# Patient Record
Sex: MALE | Race: WHITE | NOT HISPANIC OR LATINO | Employment: FULL TIME | URBAN - METROPOLITAN AREA
[De-identification: names, ages, dates, MRNs, and addresses within clinical notes are randomized per-mention and may not be internally consistent; named-entity substitution may affect disease eponyms.]

---

## 2021-03-12 ENCOUNTER — TRANSFERRED RECORDS (OUTPATIENT)
Dept: HEALTH INFORMATION MANAGEMENT | Facility: CLINIC | Age: 46
End: 2021-03-12

## 2021-04-19 ENCOUNTER — TRANSFERRED RECORDS (OUTPATIENT)
Dept: HEALTH INFORMATION MANAGEMENT | Facility: CLINIC | Age: 46
End: 2021-04-19

## 2021-06-01 ENCOUNTER — TRANSCRIBE ORDERS (OUTPATIENT)
Dept: OTHER | Age: 46
End: 2021-06-01

## 2021-06-01 DIAGNOSIS — N13.5 UPJ OBSTRUCTION, ACQUIRED: Primary | ICD-10-CM

## 2021-06-03 NOTE — TELEPHONE ENCOUNTER
MEDICAL RECORDS REQUEST   [facility]  Urology Clinic  [address] [place] [address]  PHONE: [phone]  Fax: [phone]        FUTURE VISIT INFORMATION                                                   Saleem Wilde, : 1975 scheduled for future visit at [facility]    APPOINTMENT INFORMATION:    Date: 21    Provider:  Jorge Luis    Reason for Visit/Diagnosis: UPJ obstruction    REFERRAL INFORMATION:    Referring provider:  Twin    Specialty: Urology    Referring providers clinic:  [facility] contact number:      RECORDS REQUESTED FOR VISIT                                                     NOTES  STATUS/DETAILS   OFFICE NOTE from referring provider  yes   OFFICE NOTE from other specialist  yes   DISCHARGE SUMMARY from hospital  no   DISCHARGE REPORT from the ER  no   OPERATIVE REPORT  no   MEDICATION LIST  yes   LABS     URINALYSIS (UA)  no   URINE CYTOLOGY  no

## 2021-08-05 ENCOUNTER — PRE VISIT (OUTPATIENT)
Dept: UROLOGY | Facility: CLINIC | Age: 46
End: 2021-08-05

## 2021-08-05 NOTE — TELEPHONE ENCOUNTER
Reason for visit: consult     Relevant information: UPJ obstruction    Records/imaging/labs/orders: in epic    Pt called: no     At Rooming: normal

## 2021-08-20 ENCOUNTER — LAB (OUTPATIENT)
Dept: LAB | Facility: CLINIC | Age: 46
End: 2021-08-20
Attending: UROLOGY
Payer: COMMERCIAL

## 2021-08-20 ENCOUNTER — PRE VISIT (OUTPATIENT)
Dept: UROLOGY | Facility: CLINIC | Age: 46
End: 2021-08-20

## 2021-08-20 ENCOUNTER — OFFICE VISIT (OUTPATIENT)
Dept: UROLOGY | Facility: CLINIC | Age: 46
End: 2021-08-20
Attending: UROLOGY
Payer: COMMERCIAL

## 2021-08-20 VITALS
HEART RATE: 88 BPM | WEIGHT: 305 LBS | HEIGHT: 67 IN | BODY MASS INDEX: 47.87 KG/M2 | SYSTOLIC BLOOD PRESSURE: 148 MMHG | DIASTOLIC BLOOD PRESSURE: 90 MMHG

## 2021-08-20 DIAGNOSIS — N13.5 UPJ OBSTRUCTION, ACQUIRED: ICD-10-CM

## 2021-08-20 LAB
ALBUMIN SERPL-MCNC: 3.5 G/DL (ref 3.4–5)
ALBUMIN UR-MCNC: 100 MG/DL
ALP SERPL-CCNC: 72 U/L (ref 40–150)
ALT SERPL W P-5'-P-CCNC: 18 U/L (ref 0–70)
ANION GAP SERPL CALCULATED.3IONS-SCNC: 7 MMOL/L (ref 3–14)
APPEARANCE UR: ABNORMAL
APTT PPP: 30 SECONDS (ref 22–38)
AST SERPL W P-5'-P-CCNC: 12 U/L (ref 0–45)
BILIRUB SERPL-MCNC: 0.6 MG/DL (ref 0.2–1.3)
BILIRUB UR QL STRIP: NEGATIVE
BUN SERPL-MCNC: 16 MG/DL (ref 7–30)
CALCIUM SERPL-MCNC: 9 MG/DL (ref 8.5–10.1)
CHLORIDE BLD-SCNC: 105 MMOL/L (ref 94–109)
CO2 SERPL-SCNC: 31 MMOL/L (ref 20–32)
COLOR UR AUTO: YELLOW
CREAT SERPL-MCNC: 0.6 MG/DL (ref 0.66–1.25)
GFR SERPL CREATININE-BSD FRML MDRD: >90 ML/MIN/1.73M2
GLUCOSE BLD-MCNC: 78 MG/DL (ref 70–99)
GLUCOSE UR STRIP-MCNC: NEGATIVE MG/DL
HGB UR QL STRIP: ABNORMAL
KETONES UR STRIP-MCNC: NEGATIVE MG/DL
LEUKOCYTE ESTERASE UR QL STRIP: ABNORMAL
MUCOUS THREADS #/AREA URNS LPF: PRESENT /LPF
NITRATE UR QL: NEGATIVE
PH UR STRIP: 6 [PH] (ref 5–7)
POTASSIUM BLD-SCNC: 3.6 MMOL/L (ref 3.4–5.3)
PROT SERPL-MCNC: 7.1 G/DL (ref 6.8–8.8)
RBC URINE: 21 /HPF
SODIUM SERPL-SCNC: 143 MMOL/L (ref 133–144)
SP GR UR STRIP: 1.02 (ref 1–1.03)
SQUAMOUS EPITHELIAL: 2 /HPF
UROBILINOGEN UR STRIP-MCNC: NORMAL MG/DL
WBC URINE: 42 /HPF
YEAST #/AREA URNS HPF: ABNORMAL /HPF

## 2021-08-20 PROCEDURE — 87106 FUNGI IDENTIFICATION YEAST: CPT | Performed by: UROLOGY

## 2021-08-20 PROCEDURE — 85730 THROMBOPLASTIN TIME PARTIAL: CPT | Performed by: PATHOLOGY

## 2021-08-20 PROCEDURE — 80053 COMPREHEN METABOLIC PANEL: CPT | Performed by: PATHOLOGY

## 2021-08-20 PROCEDURE — 87086 URINE CULTURE/COLONY COUNT: CPT | Performed by: UROLOGY

## 2021-08-20 PROCEDURE — 81001 URINALYSIS AUTO W/SCOPE: CPT | Performed by: PATHOLOGY

## 2021-08-20 PROCEDURE — 99205 OFFICE O/P NEW HI 60 MIN: CPT | Performed by: UROLOGY

## 2021-08-20 PROCEDURE — 36415 COLL VENOUS BLD VENIPUNCTURE: CPT | Performed by: PATHOLOGY

## 2021-08-20 RX ORDER — SEMAGLUTIDE 1.34 MG/ML
INJECTION, SOLUTION SUBCUTANEOUS
COMMUNITY
Start: 2021-07-27 | End: 2023-04-10

## 2021-08-20 RX ORDER — INSULIN GLARGINE 100 [IU]/ML
45 INJECTION, SOLUTION SUBCUTANEOUS AT BEDTIME
COMMUNITY
Start: 2021-07-26

## 2021-08-20 RX ORDER — IBUPROFEN 200 MG
400 TABLET ORAL EVERY 6 HOURS PRN
COMMUNITY

## 2021-08-20 ASSESSMENT — MIFFLIN-ST. JEOR: SCORE: 2217.1

## 2021-08-20 ASSESSMENT — PAIN SCALES - GENERAL: PAINLEVEL: NO PAIN (0)

## 2021-08-20 NOTE — NURSING NOTE
"Chief Complaint   Patient presents with     Consult       Blood pressure (!) 148/90, pulse 88, height 1.702 m (5' 7\"), weight 138.3 kg (305 lb). Body mass index is 47.77 kg/m .    There is no problem list on file for this patient.      No Known Allergies    Current Outpatient Medications   Medication Sig Dispense Refill     DULoxetine (CYMBALTA) 20 MG EC capsule Take 20 mg by mouth 2 times daily       ibuprofen (ADVIL/MOTRIN) 200 MG tablet Take 400 mg by mouth       insulin lispro prot & lispro (HUMALOG MIX 75/25 KWIKPEN) injection Inject Subcutaneous 3 times daily (before meals)       LANTUS SOLOSTAR 100 UNIT/ML soln ADMINISTER 37 UNITS UNDER THE SKIN EVERY MORNING       metFORMIN (GLUCOPHAGE) 500 MG/5ML SOLN solution        nystatin (MYCOSTATIN) cream Apply topically 2 times daily       order for DME Abdominal Binder - Large 2 each 3     OZEMPIC, 0.25 OR 0.5 MG/DOSE, 2 MG/1.5ML SOPN          Social History     Tobacco Use     Smoking status: Not on file   Substance Use Topics     Alcohol use: Not on file     Drug use: Not on file       Calvin Frazier  8/20/2021  7:26 AM  "

## 2021-08-20 NOTE — LETTER
8/20/2021       RE: Saleem Wilde  [address] [facility] [place] [address]     Dear Colleague,    Thank you for referring your patient, Saleem Wilde, to the [facility] UROLOGY [facility] [place] at [facility]. Please see a copy of my visit note below.    ASSESSMENT AND PLAN  Left  ureteropelvic junction obstruction.  Left percutaneous nephrostomy tube in place.    Complicated by:    Diabetes mellitus    Large midline ventral hernia.  Started as umbilical hernia, but he had complications post op and had to have wound vac.  He has persistent hernia with possible repair when his weight is down to #225.    Morbid obesity.  BMI 45 currently.      During counseling for this visit, we covered the details regarding the diagnosis of UPJ obstruction.  We covered possible etiologies such as congenital abnormalities, crossing vessels, and cancer.  We discussed treatment options including observation, stenting, endopyelotomy and pyeloplasty (open and robotic).    We covered how this is a relatively new treatment for the ureter but it has a long track record with urethral repair and initial outcomes appear promising.  I also talked about the advantages of a retrograde pyelogram at the time of any surgery for better diagnosis and well as the need for a ureteral stent and other drainage tubes (ROBERTO and vallejo catheter) if surgical treatment is undertaken.  Possible complications were discussed including but not limited to heart attack, stroke, blood clots to the lungs, death, muscle injury, nerve injury, spleen injury, pancreas injury, intestine injury, injury to the lungs, hernias, the possibility of persistent flank pain, need for additional procedures, and the possibility of recurrent obstruction.   Open surgery and even open nephrectomy may be necessary if robotic pyeloplasty is undertaken.    Plan    Obtain images for March CT    Schedule another Lasix Renogram to  "confirm left kidney function.  Video visit after.    If pyeloplasty is planned, a flank approach would be necessary.    Labs today  _________________________________________________________    CHIEF COMPLAINT  It was my pleasure to see Saleem Wilde who is a 45 year old male here for evaluation of possible ureteropelvic junction obstruction.    HPI  Mr. Wilde is here today with his family for evaluation of left UPJ obstruction.  His story began in November when he was diagnosed with COVID. As part of the workup for this, he underwent abdominal imaging and distention of the left renal pelvis was noted.  He denies any previous pain.  He was seen by Dr. Murcia at [facility] in [place].  It sounds like a stent was placed at some point, but he eventually ended up with a left nephrostomy tube, which he still has in place now.  Dr. Murcia went over treatment options, but Mr. Wilde has a number of issues, which make him a difficult surgical candidate, most prominently his morbid obesity and his large midline ventral hernia.  Because of this, Dr. Murcia referred him here to the [facility].    Since the nephrostomy tube was placed, Mr. Wilde denies any problems.      He denies any family history of this problem.          Per 5/25/21 Dr Murcia note  \"Saleem Wilde is a 45 year old male PMH of IDDM, obesity with large midline ventral hernia with complicated urologic history of left UPJ obstruction. In short, he initially presented to the urology department in November 2020 and was noted to have a dilated left renal pelvis full of debris and underwent stent placement at that time. He subsequently underwent ureteroscopy in January 2021 and again noted a large ball of white/gray debris. He was noted to have a narrowing of the left UPJ at that time as well (see fluoroscopic images below). Subsequently underwent a left percutaneous nephrostomy tube placement with twice daily irrigations and eventually was " "able to clear all of the debris. I performed a repeat ureteroscopy in March 2021 and noted the renal pelvis was clear and again noted the narrowing at the left UPJ. He returns today with a Lasix renogram to prove obstruction. Of note the renogram was performed with the left nephrostomy tube clamped.\"    4/19/21 Renogram  IMPRESSION:  1. Split renal function 73.7% Right, 26.3% Left.  2. Accumulation of activity within the left kidney which clears to some degree on post-Lasix study. The finding suggests fairly high-grade left-sided obstruction.  3. Mild patulous right renal collecting system which clears normally. No obstruction on the right.\"    Per 12/9/16 Dr Westbrook note  \"Multiple contraindications to surgical intervention.\"     Referred to PCP for weight management, diabetes control, continued smoking cessation.  Encourage to wear binder when out of bed.  Patient must have BMI <35, Today 60\"      Kidney stone history: Denies  Urinary tract infection history: Denies    7/23/21 Creatinine 0.77    Drinks 6 beers 2x per month.  10 years ago would drink 12 beers per day.      3/12/21 CT Abdomen/Pelvis without contrast   Radiologist Impression  1. A percutaneous left nephrostomy tube, and a double-J left ureteral catheter remain in place. Mild-to-moderate perinephric stranding similar. No hydronephrosis.   2. A complex large ventral hernia with herniation of colon and small bowel loops, without evidence of bowel obstruction.         PHYSICAL EXAM  Vitals:    08/20/21 0723   BP: (!) 148/90   Pulse: 88   Weight: 138.3 kg (305 lb)   Height: 1.702 m (5' 7\")     Wt Readings from Last 3 Encounters:   08/20/21 138.3 kg (305 lb)   12/09/16 (!) 173 kg (381 lb 6.4 oz)     Abdominal exam: Large midline hernia and/or pannus        CC  Patient Care Team:  Linh Galindo MD as PCP - General (Internal Medicine)  Dusty Westbrook MD as MD (Surgery)  Javy Kang MD as MD (Urology)  FABIEN CASTRO    Copy to " patient  DOMINIC LETICIA KATJA  [address] [facility] [place] [address]

## 2021-08-20 NOTE — PROGRESS NOTES
ASSESSMENT AND PLAN  Left  ureteropelvic junction obstruction.  Left percutaneous nephrostomy tube in place.    Complicated by:    Diabetes mellitus    Large midline ventral hernia.  Started as umbilical hernia, but he had complications post op and had to have wound vac.  He has persistent hernia with possible repair when his weight is down to #225.    Morbid obesity.  BMI 45 currently.      During counseling for this visit, we covered the details regarding the diagnosis of UPJ obstruction.  We covered possible etiologies such as congenital abnormalities, crossing vessels, and cancer.  We discussed treatment options including observation, stenting, endopyelotomy and pyeloplasty (open and robotic).    We covered how this is a relatively new treatment for the ureter but it has a long track record with urethral repair and initial outcomes appear promising.  I also talked about the advantages of a retrograde pyelogram at the time of any surgery for better diagnosis and well as the need for a ureteral stent and other drainage tubes (ROBERTO and vallejo catheter) if surgical treatment is undertaken.  Possible complications were discussed including but not limited to heart attack, stroke, blood clots to the lungs, death, muscle injury, nerve injury, spleen injury, pancreas injury, intestine injury, injury to the lungs, hernias, the possibility of persistent flank pain, need for additional procedures, and the possibility of recurrent obstruction.   Open surgery and even open nephrectomy may be necessary if robotic pyeloplasty is undertaken.    Plan    Obtain images for March CT    Schedule another Lasix Renogram to confirm left kidney function.  Video visit after.    If pyeloplasty is planned, a flank approach would be necessary.    Labs today  _________________________________________________________    CHIEF COMPLAINT  It was my pleasure to see Saleem Wilde who is a 45 year old male here for evaluation of possible  "ureteropelvic junction obstruction.    HPI  Mr. Wilde is here today with his family for evaluation of left UPJ obstruction.  His story began in November when he was diagnosed with COVID. As part of the workup for this, he underwent abdominal imaging and distention of the left renal pelvis was noted.  He denies any previous pain.  He was seen by Dr. Murcia at [facility] in [place].  It sounds like a stent was placed at some point, but he eventually ended up with a left nephrostomy tube, which he still has in place now.  Dr. Murcia went over treatment options, but Mr. Wilde has a number of issues, which make him a difficult surgical candidate, most prominently his morbid obesity and his large midline ventral hernia.  Because of this, Dr. Murcia referred him here to the [facility].    Since the nephrostomy tube was placed, Mr. Wilde denies any problems.      He denies any family history of this problem.          Per 5/25/21 Dr Murcia note  \"Saleem Wilde is a 45 year old male PMH of IDDM, obesity with large midline ventral hernia with complicated urologic history of left UPJ obstruction. In short, he initially presented to the urology department in November 2020 and was noted to have a dilated left renal pelvis full of debris and underwent stent placement at that time. He subsequently underwent ureteroscopy in January 2021 and again noted a large ball of white/gray debris. He was noted to have a narrowing of the left UPJ at that time as well (see fluoroscopic images below). Subsequently underwent a left percutaneous nephrostomy tube placement with twice daily irrigations and eventually was able to clear all of the debris. I performed a repeat ureteroscopy in March 2021 and noted the renal pelvis was clear and again noted the narrowing at the left UPJ. He returns today with a Lasix renogram to prove obstruction. Of note the renogram was performed with the left nephrostomy tube clamped.\"    4/19/21 " "Renogram  IMPRESSION:  1. Split renal function 73.7% Right, 26.3% Left.  2. Accumulation of activity within the left kidney which clears to some degree on post-Lasix study. The finding suggests fairly high-grade left-sided obstruction.  3. Mild patulous right renal collecting system which clears normally. No obstruction on the right.\"    Per 12/9/16 Dr Westbrook note  \"Multiple contraindications to surgical intervention.\"     Referred to PCP for weight management, diabetes control, continued smoking cessation.  Encourage to wear binder when out of bed.  Patient must have BMI <35, Today 60\"      Kidney stone history: Denies  Urinary tract infection history: Denies    7/23/21 Creatinine 0.77    Drinks 6 beers 2x per month.  10 years ago would drink 12 beers per day.      3/12/21 CT Abdomen/Pelvis without contrast   Radiologist Impression  1. A percutaneous left nephrostomy tube, and a double-J left ureteral catheter remain in place. Mild-to-moderate perinephric stranding similar. No hydronephrosis.   2. A complex large ventral hernia with herniation of colon and small bowel loops, without evidence of bowel obstruction.         PHYSICAL EXAM  Vitals:    08/20/21 0723   BP: (!) 148/90   Pulse: 88   Weight: 138.3 kg (305 lb)   Height: 1.702 m (5' 7\")     Wt Readings from Last 3 Encounters:   08/20/21 138.3 kg (305 lb)   12/09/16 (!) 173 kg (381 lb 6.4 oz)     Abdominal exam: Large midline hernia and/or pannus        CC  Patient Care Team:  Linh Galindo MD as PCP - General (Internal Medicine)  Dusty Westbrook MD as MD (Surgery)  Javy Kang MD as MD (Urology)  FABIEN CASTRO    Copy to patient  DOMINIC KELLY KATJA  [address] [facility] [place] [address]    "

## 2021-08-21 LAB — BACTERIA UR CULT: ABNORMAL

## 2021-09-16 ENCOUNTER — PRE VISIT (OUTPATIENT)
Dept: UROLOGY | Facility: CLINIC | Age: 46
End: 2021-09-16

## 2021-09-16 NOTE — TELEPHONE ENCOUNTER
Reason for visit: follow up     Relevant information: UPJ obstruction    Records/imaging/labs/orders: in epic    Pt called: no    At Rooming: virtual

## 2021-09-24 ENCOUNTER — VIRTUAL VISIT (OUTPATIENT)
Dept: UROLOGY | Facility: CLINIC | Age: 46
End: 2021-09-24
Payer: COMMERCIAL

## 2021-09-24 DIAGNOSIS — Q62.39 CONGENITAL OBSTRUCTION OF URETEROPELVIC JUNCTION: ICD-10-CM

## 2021-09-24 PROCEDURE — 99213 OFFICE O/P EST LOW 20 MIN: CPT | Mod: 95 | Performed by: UROLOGY

## 2021-09-24 NOTE — PROGRESS NOTES
Saleem is a 45 year old who is being evaluated via a billable telephone visit.      What phone number would you like to be contacted at? [phone]  How would you like to obtain your AVS? [email or web address]  Phone call duration: *** minutes

## 2021-09-24 NOTE — LETTER
9/24/2021       RE: Saleem Wilde  [address]  [address]  [facility] [address]     Dear Colleague,    Thank you for referring your patient, Saleem Wilde, to the [facility]OLOGY [facility] [place] at [facility]. Please see a copy of my visit note below.    ASSESSMENT AND PLAN  Left  ureteropelvic junction obstruction.  Left percutaneous nephrostomy tube in place.    Complicated by:    Diabetes mellitus    Large midline ventral hernia.  Started as umbilical hernia, but he had complications post op and had to have wound vac.  He has persistent hernia with possible repair when his weight is down to #225.    Morbid obesity.  BMI 45 currently.        Plan    Obtain images for March CT    Schedule another Lasix Renogram to confirm left kidney function. This wasn't done before the visit today, but I did talk with Dr Red in [place] about this.  Plan video visit after.    If pyeloplasty is planned, a flank approach would be necessary.    Schedule video visit in 6-8 weeks.  _________________________________________________________    CHIEF COMPLAINT  It was my pleasure to see Saleem Wilde who is a 45 year old male here for evaluation of possible ureteropelvic junction obstruction.    HPI  Mr. Wilde is here today with his family for evaluation of left UPJ obstruction.  His story began in November when he was diagnosed with COVID. As part of the workup for this, he underwent abdominal imaging and distention of the left renal pelvis was noted.  He denies any previous pain.  He was seen by Dr. Murcia at [facility] in [place].  It sounds like a stent was placed at some point, but he eventually ended up with a left nephrostomy tube, which he still has in place now.  Dr. Murcia went over treatment options, but Mr. Wilde has a number of issues, which make him a difficult surgical candidate, most prominently his morbid obesity and his large midline ventral hernia.   "Because of this, Dr. Murcia referred him here to the [facility].    Since the nephrostomy tube was placed, Mr. Wilde denies any problems.      He denies any family history of this problem.          Per 5/25/21 Dr Murcia note  \"Saleem Wilde is a 45 year old male PMH of IDDM, obesity with large midline ventral hernia with complicated urologic history of left UPJ obstruction. In short, he initially presented to the urology department in November 2020 and was noted to have a dilated left renal pelvis full of debris and underwent stent placement at that time. He subsequently underwent ureteroscopy in January 2021 and again noted a large ball of white/gray debris. He was noted to have a narrowing of the left UPJ at that time as well (see fluoroscopic images below). Subsequently underwent a left percutaneous nephrostomy tube placement with twice daily irrigations and eventually was able to clear all of the debris. I performed a repeat ureteroscopy in March 2021 and noted the renal pelvis was clear and again noted the narrowing at the left UPJ. He returns today with a Lasix renogram to prove obstruction. Of note the renogram was performed with the left nephrostomy tube clamped.\"    4/19/21 Renogram  IMPRESSION:  1. Split renal function 73.7% Right, 26.3% Left.  2. Accumulation of activity within the left kidney which clears to some degree on post-Lasix study. The finding suggests fairly high-grade left-sided obstruction.  3. Mild patulous right renal collecting system which clears normally. No obstruction on the right.\"    Per 12/9/16 Dr Westbrook note  \"Multiple contraindications to surgical intervention.\"     Referred to PCP for weight management, diabetes control, continued smoking cessation.  Encourage to wear binder when out of bed.  Patient must have BMI <35, Today 60\"      Kidney stone history: Denies  Urinary tract infection history: Denies    7/23/21 Creatinine 0.77    Drinks 6 beers 2x per month.  " 10 years ago would drink 12 beers per day.    9/24/21:  No new symptoms.  Percutaneous nephrostomy tube is very bothersome for him.    3/12/21 CT Abdomen/Pelvis without contrast   Radiologist Impression  1. A percutaneous left nephrostomy tube, and a double-J left ureteral catheter remain in place. Mild-to-moderate perinephric stranding similar. No hydronephrosis.   2. A complex large ventral hernia with herniation of colon and small bowel loops, without evidence of bowel obstruction.         PHYSICAL EXAM  There were no vitals filed for this visit.  Wt Readings from Last 3 Encounters:   08/20/21 138.3 kg (305 lb)   12/09/16 (!) 173 kg (381 lb 6.4 oz)       This visit was done via phone at the patient's request.  Time spent on phone call: 5 min  Time spent on pre-visit work, post visit work, and documentation outside of phone call time on day of visit: 2 min  Total time: 7 min          CC  Patient Care Team:  Linh Galindo MD as PCP - General (Internal Medicine)  Dusty Westbrook MD as MD (Surgery)  Javy Kang MD as MD (Urology)  FABIEN CASTRO    Copy to patient  DOMINIC HIGHTOWER  [address] [facility] [address]

## 2021-09-24 NOTE — PATIENT INSTRUCTIONS
Please Follow up with Dr. Kang in 6-8 weeks virtually with a lasix renogram beforehand.     It was a pleasure meeting with you today.  Thank you for allowing me and my team the privilege of caring for you today.  YOU are the reason we are here, and I truly hope we provided you with the excellent service you deserve.  Please let us know if there is anything else we can do for you so that we can be sure you are leaving completely satisfied with your care experience.

## 2021-09-24 NOTE — PROGRESS NOTES
"ASSESSMENT AND PLAN  Left  ureteropelvic junction obstruction.  Left percutaneous nephrostomy tube in place.    Complicated by:    Diabetes mellitus    Large midline ventral hernia.  Started as umbilical hernia, but he had complications post op and had to have wound vac.  He has persistent hernia with possible repair when his weight is down to #225.    Morbid obesity.  BMI 45 currently.        Plan    Obtain images for March CT    Schedule another Lasix Renogram to confirm left kidney function. This wasn't done before the visit today, but I did talk with Dr Red in [place] about this.  Plan video visit after.    If pyeloplasty is planned, a flank approach would be necessary.    Schedule video visit in 6-8 weeks.  _________________________________________________________    CHIEF COMPLAINT  It was my pleasure to see Saleem Wilde who is a 45 year old male here for evaluation of possible ureteropelvic junction obstruction.    HPI  Mr. Wilde is here today with his family for evaluation of left UPJ obstruction.  His story began in November when he was diagnosed with COVID. As part of the workup for this, he underwent abdominal imaging and distention of the left renal pelvis was noted.  He denies any previous pain.  He was seen by Dr. Murcia at [facility] in [place].  It sounds like a stent was placed at some point, but he eventually ended up with a left nephrostomy tube, which he still has in place now.  Dr. Murcia went over treatment options, but Mr. Wilde has a number of issues, which make him a difficult surgical candidate, most prominently his morbid obesity and his large midline ventral hernia.  Because of this, Dr. Murcia referred him here to the [facility].    Since the nephrostomy tube was placed, Mr. Wilde denies any problems.      He denies any family history of this problem.          Per 5/25/21 Dr Murcia note  \"Saleem Wilde is a 45 year old male PMH of IDDM, obesity with large " "midline ventral hernia with complicated urologic history of left UPJ obstruction. In short, he initially presented to the urology department in November 2020 and was noted to have a dilated left renal pelvis full of debris and underwent stent placement at that time. He subsequently underwent ureteroscopy in January 2021 and again noted a large ball of white/gray debris. He was noted to have a narrowing of the left UPJ at that time as well (see fluoroscopic images below). Subsequently underwent a left percutaneous nephrostomy tube placement with twice daily irrigations and eventually was able to clear all of the debris. I performed a repeat ureteroscopy in March 2021 and noted the renal pelvis was clear and again noted the narrowing at the left UPJ. He returns today with a Lasix renogram to prove obstruction. Of note the renogram was performed with the left nephrostomy tube clamped.\"    4/19/21 Renogram  IMPRESSION:  1. Split renal function 73.7% Right, 26.3% Left.  2. Accumulation of activity within the left kidney which clears to some degree on post-Lasix study. The finding suggests fairly high-grade left-sided obstruction.  3. Mild patulous right renal collecting system which clears normally. No obstruction on the right.\"    Per 12/9/16 Dr Westbrook note  \"Multiple contraindications to surgical intervention.\"     Referred to PCP for weight management, diabetes control, continued smoking cessation.  Encourage to wear binder when out of bed.  Patient must have BMI <35, Today 60\"      Kidney stone history: Denies  Urinary tract infection history: Denies    7/23/21 Creatinine 0.77    Drinks 6 beers 2x per month.  10 years ago would drink 12 beers per day.    9/24/21:  No new symptoms.  Percutaneous nephrostomy tube is very bothersome for him.    3/12/21 CT Abdomen/Pelvis without contrast   Radiologist Impression  1. A percutaneous left nephrostomy tube, and a double-J left ureteral catheter remain in place. " Mild-to-moderate perinephric stranding similar. No hydronephrosis.   2. A complex large ventral hernia with herniation of colon and small bowel loops, without evidence of bowel obstruction.         PHYSICAL EXAM  There were no vitals filed for this visit.  Wt Readings from Last 3 Encounters:   08/20/21 138.3 kg (305 lb)   12/09/16 (!) 173 kg (381 lb 6.4 oz)       This visit was done via phone at the patient's request.  Time spent on phone call: 5 min  Time spent on pre-visit work, post visit work, and documentation outside of phone call time on day of visit: 2 min  Total time: 7 min          CC  Patient Care Team:  Linh Galindo MD as PCP - General (Internal Medicine)  Dusty Westbrook MD as MD (Surgery)  Javy Kang MD as MD (Urology)  FABIEN CASTRO    Copy to patient  DOMINIC HIGHTOWER  [address] [facility] [place] [address]

## 2021-11-11 ENCOUNTER — PRE VISIT (OUTPATIENT)
Dept: UROLOGY | Facility: CLINIC | Age: 46
End: 2021-11-11
Payer: COMMERCIAL

## 2021-11-11 NOTE — TELEPHONE ENCOUNTER
Reason for visit: follow up     Relevant information: UPJ obstruction    Records/imaging/labs/orders: lasix renogram in epic    Pt called: no    At Rooming: phone

## 2021-11-12 ENCOUNTER — PREP FOR PROCEDURE (OUTPATIENT)
Dept: UROLOGY | Facility: CLINIC | Age: 46
End: 2021-11-12

## 2021-11-12 ENCOUNTER — VIRTUAL VISIT (OUTPATIENT)
Dept: UROLOGY | Facility: CLINIC | Age: 46
End: 2021-11-12
Payer: COMMERCIAL

## 2021-11-12 DIAGNOSIS — Q62.39 CONGENITAL OBSTRUCTION OF URETEROPELVIC JUNCTION: Primary | ICD-10-CM

## 2021-11-12 DIAGNOSIS — N13.5 STRICTURE OR KINKING OF URETER: Primary | ICD-10-CM

## 2021-11-12 PROCEDURE — 99213 OFFICE O/P EST LOW 20 MIN: CPT | Mod: 95 | Performed by: UROLOGY

## 2021-11-12 RX ORDER — CEFAZOLIN SODIUM IN 0.9 % NACL 3 G/100 ML
3 INTRAVENOUS SOLUTION, PIGGYBACK (ML) INTRAVENOUS SEE ADMIN INSTRUCTIONS
Status: CANCELLED | OUTPATIENT
Start: 2021-11-12

## 2021-11-12 RX ORDER — CEFAZOLIN SODIUM IN 0.9 % NACL 3 G/100 ML
3 INTRAVENOUS SOLUTION, PIGGYBACK (ML) INTRAVENOUS
Status: CANCELLED | OUTPATIENT
Start: 2021-11-12

## 2021-11-12 NOTE — PROGRESS NOTES
Saleem is a 46 year old who is being evaluated via a billable telephone visit.      What phone number would you like to be contacted at? [phone]  How would you like to obtain your AVS? Mail a copy  Phone call duration: *** minutes

## 2021-11-12 NOTE — LETTER
11/12/2021       RE: Saleem Wilde  [address] [facility] [address]  [place] [address]     Dear Colleague,    Thank you for referring your patient, Saleem Wilde, to the [facility] [place] [facility]. Please see a copy of my visit note below.    ASSESSMENT AND PLAN  Left  ureteropelvic junction obstruction.  Left percutaneous nephrostomy tube in place.    Complicated by:    Diabetes mellitus    Large midline ventral hernia.  Started as umbilical hernia, but he had complications post op and had to have wound vac.  He has persistent hernia with possible repair when his weight is down to #225.    Morbid obesity.  BMI 45 currently.        Plan    Schedule left retrograde pyelogram, ureteroscopy, possible robot pyeloplasty.  _________________________________________________________    CHIEF COMPLAINT  It was my pleasure to see Saleem Wilde who is a 45 year old male here for evaluation of possible ureteropelvic junction obstruction.    HPI  Mr. Wilde is here today with his family for evaluation of left UPJ obstruction.  His story began in November when he was diagnosed with COVID. As part of the workup for this, he underwent abdominal imaging and distention of the left renal pelvis was noted.  He denies any previous pain.  He was seen by Dr. Murcia at [facility] in [place].  It sounds like a stent was placed at some point, but he eventually ended up with a left nephrostomy tube, which he still has in place now.  Dr. Murcia went over treatment options, but Mr. Wilde has a number of issues, which make him a difficult surgical candidate, most prominently his morbid obesity and his large midline ventral hernia.  Because of this, Dr. Murcia referred him here to the [facility].    Since the nephrostomy tube was placed, Mr. Wilde denies any problems.      He denies any family history of this problem.    10/5/21 Lasix Renogram   I  "reviewed the radiologic images and report from this radiologic exam.  My independent interpretation is:    No images    Radiologist Impression  FINDINGS: There is asymmetrically diminished perfusion to the left kidney. The split function is 30% on the left and 70% on the right.     Following Lasix administration, the T half max on the left is 12 minutes. T half max on the right is 17 minutes following Lasix administration.     IMPRESSION:   1.  Asymmetric decreased uptake and perfusion of radiotracer in the left kidney.   2.  Normal left renal excretion following Lasix administration.   3.  T half max on the right is within the indeterminate range.           Per 5/25/21 Dr Murcia note  \"Saleem Wilde is a 45 year old male PMH of IDDM, obesity with large midline ventral hernia with complicated urologic history of left UPJ obstruction. In short, he initially presented to the urology department in November 2020 and was noted to have a dilated left renal pelvis full of debris and underwent stent placement at that time. He subsequently underwent ureteroscopy in January 2021 and again noted a large ball of white/gray debris. He was noted to have a narrowing of the left UPJ at that time as well (see fluoroscopic images below). Subsequently underwent a left percutaneous nephrostomy tube placement with twice daily irrigations and eventually was able to clear all of the debris. I performed a repeat ureteroscopy in March 2021 and noted the renal pelvis was clear and again noted the narrowing at the left UPJ. He returns today with a Lasix renogram to prove obstruction. Of note the renogram was performed with the left nephrostomy tube clamped.\"    4/19/21 Renogram  IMPRESSION:  1. Split renal function 73.7% Right, 26.3% Left.  2. Accumulation of activity within the left kidney which clears to some degree on post-Lasix study. The finding suggests fairly high-grade left-sided obstruction.  3. Mild patulous right renal " "collecting system which clears normally. No obstruction on the right.\"    Per 12/9/16 Dr Westbrook note  \"Multiple contraindications to surgical intervention.\"     Referred to PCP for weight management, diabetes control, continued smoking cessation.  Encourage to wear binder when out of bed.  Patient must have BMI <35, Today 60\"      Kidney stone history: Denies  Urinary tract infection history: Denies    7/23/21 Creatinine 0.77    Drinks 6 beers 2x per month.  10 years ago would drink 12 beers per day.    9/24/21:  No new symptoms.  Percutaneous nephrostomy tube is very bothersome for him.    3/12/21 CT Abdomen/Pelvis without contrast   Radiologist Impression  1. A percutaneous left nephrostomy tube, and a double-J left ureteral catheter remain in place. Mild-to-moderate perinephric stranding similar. No hydronephrosis.   2. A complex large ventral hernia with herniation of colon and small bowel loops, without evidence of bowel obstruction.         PHYSICAL EXAM  There were no vitals filed for this visit.  Wt Readings from Last 3 Encounters:   08/20/21 138.3 kg (305 lb)   12/09/16 (!) 173 kg (381 lb 6.4 oz)       This visit was done via phone at the patient's request.  Time spent on phone call: 5 min  Time spent on pre-visit work, post visit work, and documentation outside of phone call time on day of visit: 2 min  Total time: 7 min          CC  Patient Care Team:  Linh Galindo MD as PCP - General (Internal Medicine)  Dusty Westbrook MD as MD (Surgery)  Javy Kang MD as MD (Urology)  Javy Kang MD as Assigned Surgical Provider  FABIEN CASTRO    Copy to patient  DOMINIC HIGHTOWER  [address] [facility] [place] [address]      "

## 2021-11-12 NOTE — PROGRESS NOTES
ASSESSMENT AND PLAN  Left  ureteropelvic junction obstruction.  Left percutaneous nephrostomy tube in place.    Complicated by:    Diabetes mellitus    Large midline ventral hernia.  Started as umbilical hernia, but he had complications post op and had to have wound vac.  He has persistent hernia with possible repair when his weight is down to #225.    Morbid obesity.  BMI 45 currently.        Plan    Schedule left retrograde pyelogram, ureteroscopy, possible robot pyeloplasty.  _________________________________________________________    CHIEF COMPLAINT  It was my pleasure to see Saleem Wilde who is a 45 year old male here for evaluation of possible ureteropelvic junction obstruction.    HPI  Mr. Wilde is here today with his family for evaluation of left UPJ obstruction.  His story began in November when he was diagnosed with COVID. As part of the workup for this, he underwent abdominal imaging and distention of the left renal pelvis was noted.  He denies any previous pain.  He was seen by Dr. Murcia at [facility] in [place].  It sounds like a stent was placed at some point, but he eventually ended up with a left nephrostomy tube, which he still has in place now.  Dr. Murcia went over treatment options, but Mr. Wilde has a number of issues, which make him a difficult surgical candidate, most prominently his morbid obesity and his large midline ventral hernia.  Because of this, Dr. Murcia referred him here to the [facility].    Since the nephrostomy tube was placed, Mr. Wilde denies any problems.      He denies any family history of this problem.    10/5/21 Lasix Renogram   I reviewed the radiologic images and report from this radiologic exam.  My independent interpretation is:    No images    Radiologist Impression  FINDINGS: There is asymmetrically diminished perfusion to the left kidney. The split function is 30% on the left and 70% on the right.     Following Lasix administration, the T  "half max on the left is 12 minutes. T half max on the right is 17 minutes following Lasix administration.     IMPRESSION:   1.  Asymmetric decreased uptake and perfusion of radiotracer in the left kidney.   2.  Normal left renal excretion following Lasix administration.   3.  T half max on the right is within the indeterminate range.           Per 5/25/21 Dr Murcia note  \"Saleem Wilde is a 45 year old male PMH of IDDM, obesity with large midline ventral hernia with complicated urologic history of left UPJ obstruction. In short, he initially presented to the urology department in November 2020 and was noted to have a dilated left renal pelvis full of debris and underwent stent placement at that time. He subsequently underwent ureteroscopy in January 2021 and again noted a large ball of white/gray debris. He was noted to have a narrowing of the left UPJ at that time as well (see fluoroscopic images below). Subsequently underwent a left percutaneous nephrostomy tube placement with twice daily irrigations and eventually was able to clear all of the debris. I performed a repeat ureteroscopy in March 2021 and noted the renal pelvis was clear and again noted the narrowing at the left UPJ. He returns today with a Lasix renogram to prove obstruction. Of note the renogram was performed with the left nephrostomy tube clamped.\"    4/19/21 Renogram  IMPRESSION:  1. Split renal function 73.7% Right, 26.3% Left.  2. Accumulation of activity within the left kidney which clears to some degree on post-Lasix study. The finding suggests fairly high-grade left-sided obstruction.  3. Mild patulous right renal collecting system which clears normally. No obstruction on the right.\"    Per 12/9/16 Dr Westbrook note  \"Multiple contraindications to surgical intervention.\"     Referred to PCP for weight management, diabetes control, continued smoking cessation.  Encourage to wear binder when out of bed.  Patient must have BMI <35, Today " "60\"      Kidney stone history: Denies  Urinary tract infection history: Denies    7/23/21 Creatinine 0.77    Drinks 6 beers 2x per month.  10 years ago would drink 12 beers per day.    9/24/21:  No new symptoms.  Percutaneous nephrostomy tube is very bothersome for him.    3/12/21 CT Abdomen/Pelvis without contrast   Radiologist Impression  1. A percutaneous left nephrostomy tube, and a double-J left ureteral catheter remain in place. Mild-to-moderate perinephric stranding similar. No hydronephrosis.   2. A complex large ventral hernia with herniation of colon and small bowel loops, without evidence of bowel obstruction.         PHYSICAL EXAM  There were no vitals filed for this visit.  Wt Readings from Last 3 Encounters:   08/20/21 138.3 kg (305 lb)   12/09/16 (!) 173 kg (381 lb 6.4 oz)       This visit was done via phone at the patient's request.  Time spent on phone call: 5 min  Time spent on pre-visit work, post visit work, and documentation outside of phone call time on day of visit: 2 min  Total time: 7 min          CC  Patient Care Team:  Linh Galindo MD as PCP - General (Internal Medicine)  Dusty Westbrook MD as MD (Surgery)  Javy Kang MD as MD (Urology)  Javy Kang MD as Assigned Surgical Provider  FABIEN CASTRO    Copy to patient  DOMINIC HIGHTOWER  [address] [facility] [place] [address]    "

## 2021-11-22 NOTE — PATIENT INSTRUCTIONS
Please proceed with surgery as desired.     It was a pleasure meeting with you today.  Thank you for allowing me and my team the privilege of caring for you today.  YOU are the reason we are here, and I truly hope we provided you with the excellent service you deserve.  Please let us know if there is anything else we can do for you so that we can be sure you are leaving completely satisfied with your care experience.

## 2021-11-30 ENCOUNTER — TELEPHONE (OUTPATIENT)
Dept: UROLOGY | Facility: CLINIC | Age: 46
End: 2021-11-30
Payer: COMMERCIAL

## 2021-11-30 NOTE — CONFIDENTIAL NOTE
Patient is scheduled for surgery with Dr. Kang    Spoke with: Saleem Coleman voice mail with: n/a    Date of Surgery: 2/14/22    Location: [facility]    H&P: Scheduled with [facility]    Pre-procedure COVID-19 Test: patient to schedule closer to home within 4 days of the surgery    Additional imaging/appointments: n/a    Surgery packet: to be mailed by 12/1/21     Additional comments: n/a

## 2022-01-19 ENCOUNTER — TELEPHONE (OUTPATIENT)
Dept: UROLOGY | Facility: CLINIC | Age: 47
End: 2022-01-19
Payer: COMMERCIAL

## 2022-01-19 DIAGNOSIS — Z11.59 SPECIAL SCREENING EXAMINATION FOR VIRAL DISEASE: ICD-10-CM

## 2022-01-19 DIAGNOSIS — Q62.39 CONGENITAL OBSTRUCTION OF URETEROPELVIC JUNCTION: Primary | ICD-10-CM

## 2022-01-19 NOTE — TELEPHONE ENCOUNTER
----- Message from Yaquelin Jimenez RN sent at 1/19/2022  2:38 PM CST -----  Can you please fax the order for the UA and COVID to [phone] for this patient. The orders are on the printer. Thank you

## 2022-02-02 DIAGNOSIS — Z11.59 ENCOUNTER FOR SCREENING FOR OTHER VIRAL DISEASES: Primary | ICD-10-CM

## 2022-02-10 ENCOUNTER — ANESTHESIA EVENT (OUTPATIENT)
Dept: SURGERY | Facility: CLINIC | Age: 47
End: 2022-02-10

## 2022-02-11 ENCOUNTER — PATIENT OUTREACH (OUTPATIENT)
Dept: UROLOGY | Facility: CLINIC | Age: 47
End: 2022-02-11
Payer: COMMERCIAL

## 2022-02-11 DIAGNOSIS — N13.5 UPJ OBSTRUCTION, ACQUIRED: Primary | ICD-10-CM

## 2022-02-11 RX ORDER — CIPROFLOXACIN 500 MG/1
500 TABLET, FILM COATED ORAL 2 TIMES DAILY
Qty: 6 TABLET | Refills: 0 | Status: SHIPPED | OUTPATIENT
Start: 2022-02-11 | End: 2022-02-14

## 2022-02-11 NOTE — TELEPHONE ENCOUNTER
Pre Op Teaching Flowsheet       Pre and Post op Patient Education  Relevant Diagnosis:  Stricture or kinking of ureter  Surgical procedure:  PYELOPLASTY, ROBOT-ASSISTED, LAPAROSCOPIC. Cystoscopy, ureteroscopy, stent  Person Involved in teaching: Saleem Wilde      Patient demonstrates understanding of the following:  Date of surgery:  2/14/22  Location of surgery:  3rd [facility]  History and Physical and any other testing necessary prior to surgery: Yes  Required time line for completion of History and Physical and any pre-op testing: Yes      Patient demonstrates understanding of the following:  Pre-op bowel prep:  None  Pre-op showering/scrub information with PCMX Soap: Yes  Blood thinner medications discussed and when to stop (if applicable):  Yes  Diabetic Management teaching:  Yes      Infection Prevention:   Patient demonstrates understanding of the following:  Surgical procedure site care taught: at time of discharge  Signs and symptoms of infection taught:  Yes      Post-op follow-up:  Discussed how to contact the hospital, nurse, and clinic scheduling staff if necessary.    Instructional materials used/given/mailed:  [place] Surgery Booklet, Soap..    Surgical instructions packet mailed to patient.     referral: No     home:  Yes    Care Giver:  Sister    PCP:  Linh Galindo MD    Patient had COVID testing done.

## 2022-02-13 ASSESSMENT — LIFESTYLE VARIABLES: TOBACCO_USE: 1

## 2022-02-13 NOTE — ANESTHESIA PREPROCEDURE EVALUATION
"Anesthesia Pre-Procedure Evaluation    Patient: Saleem Wilde   MRN:     [id number] Gender:   male   Age:    46 year old :      1975        Preoperative Diagnosis: Stricture or kinking of ureter [N13.5]   Procedure(s):  PYELOPLASTY, ROBOT-ASSISTED, LAPAROSCOPIC. Cystoscopy, ureteroscopy, stent LEFT     LABS:  CBC: No results found for: WBC, HGB, HCT, PLT  BMP:   Lab Results   Component Value Date     2021    POTASSIUM 3.6 2021    CHLORIDE 105 2021    CO2 31 2021    BUN 16 2021    CR 0.60 (L) 2021    GLC 78 2021     COAGS:   Lab Results   Component Value Date    PTT 30 2021     POC: No results found for: BGM, HCG, HCGS  OTHER:   Lab Results   Component Value Date    GUILLE 9.0 2021    ALBUMIN 3.5 2021    PROTTOTAL 7.1 2021    ALT 18 2021    AST 12 2021    ALKPHOS 72 2021    BILITOTAL 0.6 2021        Preop Vitals    BP Readings from Last 3 Encounters:   21 (!) 148/90   16 145/86    Pulse Readings from Last 3 Encounters:   21 88   16 93      Resp Readings from Last 3 Encounters:   No data found for Resp    SpO2 Readings from Last 3 Encounters:   16 94%      Temp Readings from Last 1 Encounters:   No data found for Temp    Ht Readings from Last 1 Encounters:   21 1.702 m (5' 7\")      Wt Readings from Last 1 Encounters:   21 138.3 kg (305 lb)    Estimated body mass index is 47.77 kg/m  as calculated from the following:    Height as of 21: 1.702 m (5' 7\").    Weight as of 21: 138.3 kg (305 lb).     LDA:        History reviewed. No pertinent past medical history.   History reviewed. No pertinent surgical history.   Allergies   Allergen Reactions     Adhesive Tape Other (See Comments)     Causes burns and blisters        Anesthesia Evaluation    ANESTHESIA PHYSICAL [id number]         Yenifer Higginbotham MD  "

## 2022-02-13 NOTE — ANESTHESIA PREPROCEDURE EVALUATION
Anesthesia Pre-Procedure Evaluation    Patient: Saleem Wilde   MRN: [id number] : 1975        Preoperative Diagnosis: Stricture or kinking of ureter [N13.5]    Procedure : Procedure(s):  PYELOPLASTY, ROBOT-ASSISTED, LAPAROSCOPIC. Cystoscopy, ureteroscopy, stent LEFT          History reviewed. No pertinent past medical history.   History reviewed. No pertinent surgical history.   Allergies   Allergen Reactions     Adhesive Tape Other (See Comments)     Causes burns and blisters      Social History     Tobacco Use     Smoking status: Former Smoker     Smokeless tobacco: Never Used   Substance Use Topics     Alcohol use: Yes     Comment: once a month      Wt Readings from Last 1 Encounters:   21 138.3 kg (305 lb)        Anesthesia Evaluation   Pt has had prior anesthetic.         ROS/MED HX  ENT/Pulmonary: Comment:   - History of tonsillectomy and adenoidectomy in childhood      (+) KUNAL risk factors, obese, tobacco use, Current use,     Neurologic:       Cardiovascular:     (+) hypertension-----    METS/Exercise Tolerance:     Hematologic:       Musculoskeletal:       GI/Hepatic: Comment:   - History of small bowel obstruction s/p ex lap  - Large midline ventral hernia        Renal/Genitourinary: Comment:   - Hydronephrosis, left kidney      (+) renal disease (Left UPJ obstruction),     Endo:     (+) type II DM, Last HgA1c: 5.9, date: 16,     Psychiatric/Substance Use:       Infectious Disease:       Malignancy:  - neg malignancy ROS     Other:               OUTSIDE LABS:  CBC: No results found for: WBC, HGB, HCT, PLT  BMP:   Lab Results   Component Value Date     2021    POTASSIUM 3.6 2021    CHLORIDE 105 2021    CO2 31 2021    BUN 16 2021    CR 0.60 (L) 2021    GLC 78 2021     COAGS:   Lab Results   Component Value Date    PTT 30 2021     POC: No results found for: BGM, HCG, HCGS  HEPATIC:   Lab Results   Component Value Date    ALBUMIN 3.5  08/20/2021    PROTTOTAL 7.1 08/20/2021    ALT 18 08/20/2021    AST 12 08/20/2021    ALKPHOS 72 08/20/2021    BILITOTAL 0.6 08/20/2021     OTHER:   Lab Results   Component Value Date    GUILLE 9.0 08/20/2021       Anesthesia Plan    ASA Status:  3      Anesthesia Type: General.     - Airway: ETT   Induction: Intravenous, Propofol.   Maintenance: Balanced.   Techniques and Equipment:     - Airway: Video-Laryngoscope, Shoulder Thompson/Ramp     - Lines/Monitors: 2nd IV     Consents            Postoperative Care    Pain management: IV analgesics, Multi-modal analgesia.   PONV prophylaxis: Ondansetron (or other 5HT-3), Dexamethasone or Solumedrol     Comments:                Yenifer Higginbotham MD

## 2022-02-14 ENCOUNTER — APPOINTMENT (OUTPATIENT)
Dept: GENERAL RADIOLOGY | Facility: CLINIC | Age: 47
End: 2022-02-14
Attending: UROLOGY

## 2022-02-14 ENCOUNTER — HOSPITAL ENCOUNTER (OUTPATIENT)
Facility: CLINIC | Age: 47
Discharge: HOME | End: 2022-02-14
Attending: UROLOGY | Admitting: UROLOGY
Payer: COMMERCIAL

## 2022-02-14 ENCOUNTER — ANESTHESIA (OUTPATIENT)
Dept: SURGERY | Facility: CLINIC | Age: 47
End: 2022-02-14

## 2022-02-14 VITALS
TEMPERATURE: 97.5 F | OXYGEN SATURATION: 99 % | RESPIRATION RATE: 22 BRPM | HEIGHT: 67 IN | HEART RATE: 68 BPM | BODY MASS INDEX: 49.44 KG/M2 | DIASTOLIC BLOOD PRESSURE: 84 MMHG | SYSTOLIC BLOOD PRESSURE: 140 MMHG | WEIGHT: 315 LBS

## 2022-02-14 DIAGNOSIS — N13.5 UPJ OBSTRUCTION, ACQUIRED: ICD-10-CM

## 2022-02-14 LAB
ABO/RH(D): NORMAL
ANTIBODY SCREEN: NEGATIVE
GLUCOSE BLDC GLUCOMTR-MCNC: 218 MG/DL (ref 70–99)
GLUCOSE BLDC GLUCOMTR-MCNC: 248 MG/DL (ref 70–99)
SPECIMEN EXPIRATION DATE: NORMAL

## 2022-02-14 PROCEDURE — C1769 GUIDE WIRE: HCPCS | Performed by: UROLOGY

## 2022-02-14 PROCEDURE — 999N000141 HC STATISTIC PRE-PROCEDURE NURSING ASSESSMENT: Performed by: UROLOGY

## 2022-02-14 PROCEDURE — 258N000003 HC RX IP 258 OP 636: Performed by: NURSE ANESTHETIST, CERTIFIED REGISTERED

## 2022-02-14 PROCEDURE — 88305 TISSUE EXAM BY PATHOLOGIST: CPT | Mod: 26 | Performed by: PATHOLOGY

## 2022-02-14 PROCEDURE — 370N000017 HC ANESTHESIA TECHNICAL FEE, PER MIN: Performed by: UROLOGY

## 2022-02-14 PROCEDURE — 88305 TISSUE EXAM BY PATHOLOGIST: CPT | Mod: TC | Performed by: UROLOGY

## 2022-02-14 PROCEDURE — 272N000001 HC OR GENERAL SUPPLY STERILE: Performed by: UROLOGY

## 2022-02-14 PROCEDURE — 52354 CYSTOURETERO W/BIOPSY: CPT | Mod: LT | Performed by: UROLOGY

## 2022-02-14 PROCEDURE — 250N000013 HC RX MED GY IP 250 OP 250 PS 637: Performed by: ANESTHESIOLOGY

## 2022-02-14 PROCEDURE — 250N000011 HC RX IP 250 OP 636: Performed by: ANESTHESIOLOGY

## 2022-02-14 PROCEDURE — 250N000011 HC RX IP 250 OP 636: Performed by: UROLOGY

## 2022-02-14 PROCEDURE — 88112 CYTOPATH CELL ENHANCE TECH: CPT | Mod: 26 | Performed by: PATHOLOGY

## 2022-02-14 PROCEDURE — 255N000002 HC RX 255 OP 636: Performed by: STUDENT IN AN ORGANIZED HEALTH CARE EDUCATION/TRAINING PROGRAM

## 2022-02-14 PROCEDURE — 250N000025 HC SEVOFLURANE, PER MIN: Performed by: UROLOGY

## 2022-02-14 PROCEDURE — 710N000012 HC RECOVERY PHASE 2, PER MINUTE: Performed by: UROLOGY

## 2022-02-14 PROCEDURE — 88112 CYTOPATH CELL ENHANCE TECH: CPT | Mod: TC | Performed by: UROLOGY

## 2022-02-14 PROCEDURE — 86850 RBC ANTIBODY SCREEN: CPT | Performed by: ANESTHESIOLOGY

## 2022-02-14 PROCEDURE — 360N000075 HC SURGERY LEVEL 2, PER MIN: Performed by: UROLOGY

## 2022-02-14 PROCEDURE — 250N000009 HC RX 250: Performed by: NURSE ANESTHETIST, CERTIFIED REGISTERED

## 2022-02-14 PROCEDURE — 86901 BLOOD TYPING SEROLOGIC RH(D): CPT | Performed by: ANESTHESIOLOGY

## 2022-02-14 PROCEDURE — 258N000003 HC RX IP 258 OP 636: Performed by: ANESTHESIOLOGY

## 2022-02-14 PROCEDURE — 999N000180 XR SURGERY CARM FLUORO LESS THAN 5 MIN: Mod: TC

## 2022-02-14 PROCEDURE — 82962 GLUCOSE BLOOD TEST: CPT

## 2022-02-14 PROCEDURE — 250N000011 HC RX IP 250 OP 636: Performed by: NURSE ANESTHETIST, CERTIFIED REGISTERED

## 2022-02-14 PROCEDURE — 710N000010 HC RECOVERY PHASE 1, LEVEL 2, PER MIN: Performed by: UROLOGY

## 2022-02-14 PROCEDURE — 74420 UROGRAPHY RTRGR +-KUB: CPT | Mod: 26 | Performed by: UROLOGY

## 2022-02-14 RX ORDER — SODIUM CHLORIDE 9 MG/ML
INJECTION, SOLUTION INTRAVENOUS CONTINUOUS PRN
Status: DISCONTINUED | OUTPATIENT
Start: 2022-02-14 | End: 2022-02-14

## 2022-02-14 RX ORDER — HYDROMORPHONE HCL IN WATER/PF 6 MG/30 ML
0.2 PATIENT CONTROLLED ANALGESIA SYRINGE INTRAVENOUS EVERY 5 MIN PRN
Status: DISCONTINUED | OUTPATIENT
Start: 2022-02-14 | End: 2022-02-14 | Stop reason: HOSPADM

## 2022-02-14 RX ORDER — FENTANYL CITRATE 50 UG/ML
25 INJECTION, SOLUTION INTRAMUSCULAR; INTRAVENOUS
Status: DISCONTINUED | OUTPATIENT
Start: 2022-02-14 | End: 2022-02-14 | Stop reason: HOSPADM

## 2022-02-14 RX ORDER — LIDOCAINE HYDROCHLORIDE 20 MG/ML
INJECTION, SOLUTION INFILTRATION; PERINEURAL PRN
Status: DISCONTINUED | OUTPATIENT
Start: 2022-02-14 | End: 2022-02-14

## 2022-02-14 RX ORDER — FENTANYL CITRATE 50 UG/ML
INJECTION, SOLUTION INTRAMUSCULAR; INTRAVENOUS PRN
Status: DISCONTINUED | OUTPATIENT
Start: 2022-02-14 | End: 2022-02-14

## 2022-02-14 RX ORDER — PROPOFOL 10 MG/ML
INJECTION, EMULSION INTRAVENOUS PRN
Status: DISCONTINUED | OUTPATIENT
Start: 2022-02-14 | End: 2022-02-14

## 2022-02-14 RX ORDER — LIDOCAINE 40 MG/G
CREAM TOPICAL
Status: DISCONTINUED | OUTPATIENT
Start: 2022-02-14 | End: 2022-02-14 | Stop reason: HOSPADM

## 2022-02-14 RX ORDER — FENTANYL CITRATE 50 UG/ML
25 INJECTION, SOLUTION INTRAMUSCULAR; INTRAVENOUS EVERY 5 MIN PRN
Status: DISCONTINUED | OUTPATIENT
Start: 2022-02-14 | End: 2022-02-14 | Stop reason: HOSPADM

## 2022-02-14 RX ORDER — CEFAZOLIN SODIUM IN 0.9 % NACL 3 G/100 ML
3 INTRAVENOUS SOLUTION, PIGGYBACK (ML) INTRAVENOUS SEE ADMIN INSTRUCTIONS
Status: DISCONTINUED | OUTPATIENT
Start: 2022-02-14 | End: 2022-02-14 | Stop reason: HOSPADM

## 2022-02-14 RX ORDER — CEFAZOLIN SODIUM IN 0.9 % NACL 3 G/100 ML
3 INTRAVENOUS SOLUTION, PIGGYBACK (ML) INTRAVENOUS
Status: COMPLETED | OUTPATIENT
Start: 2022-02-14 | End: 2022-02-14

## 2022-02-14 RX ORDER — SODIUM CHLORIDE, SODIUM LACTATE, POTASSIUM CHLORIDE, CALCIUM CHLORIDE 600; 310; 30; 20 MG/100ML; MG/100ML; MG/100ML; MG/100ML
INJECTION, SOLUTION INTRAVENOUS CONTINUOUS
Status: DISCONTINUED | OUTPATIENT
Start: 2022-02-14 | End: 2022-02-14 | Stop reason: HOSPADM

## 2022-02-14 RX ORDER — CIPROFLOXACIN 500 MG/1
500 TABLET, FILM COATED ORAL 2 TIMES DAILY
Qty: 10 TABLET | Refills: 0 | Status: SHIPPED | OUTPATIENT
Start: 2022-02-14 | End: 2022-02-19

## 2022-02-14 RX ORDER — MEPERIDINE HYDROCHLORIDE 25 MG/ML
12.5 INJECTION INTRAMUSCULAR; INTRAVENOUS; SUBCUTANEOUS
Status: DISCONTINUED | OUTPATIENT
Start: 2022-02-14 | End: 2022-02-14 | Stop reason: HOSPADM

## 2022-02-14 RX ORDER — FLUCONAZOLE 200 MG/1
200 TABLET ORAL DAILY
Qty: 5 TABLET | Refills: 0 | Status: SHIPPED | OUTPATIENT
Start: 2022-02-14 | End: 2022-02-19

## 2022-02-14 RX ORDER — ACETAMINOPHEN 325 MG/1
975 TABLET ORAL ONCE
Status: COMPLETED | OUTPATIENT
Start: 2022-02-14 | End: 2022-02-14

## 2022-02-14 RX ORDER — OXYCODONE HYDROCHLORIDE 5 MG/1
5 TABLET ORAL EVERY 4 HOURS PRN
Status: DISCONTINUED | OUTPATIENT
Start: 2022-02-14 | End: 2022-02-14 | Stop reason: HOSPADM

## 2022-02-14 RX ORDER — ONDANSETRON 2 MG/ML
INJECTION INTRAMUSCULAR; INTRAVENOUS PRN
Status: DISCONTINUED | OUTPATIENT
Start: 2022-02-14 | End: 2022-02-14

## 2022-02-14 RX ADMIN — ONDANSETRON 4 MG: 2 INJECTION INTRAMUSCULAR; INTRAVENOUS at 09:07

## 2022-02-14 RX ADMIN — FENTANYL CITRATE 100 MCG: 50 INJECTION, SOLUTION INTRAMUSCULAR; INTRAVENOUS at 08:11

## 2022-02-14 RX ADMIN — ROCURONIUM BROMIDE 70 MG: 50 INJECTION, SOLUTION INTRAVENOUS at 08:11

## 2022-02-14 RX ADMIN — SUGAMMADEX 400 MG: 100 INJECTION, SOLUTION INTRAVENOUS at 09:30

## 2022-02-14 RX ADMIN — HYDROMORPHONE HYDROCHLORIDE 0.2 MG: 0.2 INJECTION, SOLUTION INTRAMUSCULAR; INTRAVENOUS; SUBCUTANEOUS at 10:05

## 2022-02-14 RX ADMIN — LIDOCAINE HYDROCHLORIDE 100 MG: 20 INJECTION, SOLUTION INFILTRATION; PERINEURAL at 08:11

## 2022-02-14 RX ADMIN — Medication 3 G: at 08:05

## 2022-02-14 RX ADMIN — ACETAMINOPHEN 975 MG: 325 TABLET, FILM COATED ORAL at 07:28

## 2022-02-14 RX ADMIN — PROPOFOL 250 MG: 10 INJECTION, EMULSION INTRAVENOUS at 08:11

## 2022-02-14 RX ADMIN — SODIUM CHLORIDE: 9 INJECTION, SOLUTION INTRAVENOUS at 09:50

## 2022-02-14 RX ADMIN — SODIUM CHLORIDE, POTASSIUM CHLORIDE, SODIUM LACTATE AND CALCIUM CHLORIDE: 600; 310; 30; 20 INJECTION, SOLUTION INTRAVENOUS at 08:05

## 2022-02-14 RX ADMIN — MIDAZOLAM 2 MG: 1 INJECTION INTRAMUSCULAR; INTRAVENOUS at 08:05

## 2022-02-14 RX ADMIN — SODIUM CHLORIDE, POTASSIUM CHLORIDE, SODIUM LACTATE AND CALCIUM CHLORIDE: 600; 310; 30; 20 INJECTION, SOLUTION INTRAVENOUS at 09:15

## 2022-02-14 ASSESSMENT — MIFFLIN-ST. JEOR: SCORE: 2354.63

## 2022-02-14 NOTE — PROGRESS NOTES
Dr. Yenifer Higginbotham aware that blood glucose is 218. Pt was previously 248 on arrival to hospital today. Pt reports higher blood glucoses since taking antibiotics. Per Dr. Higginbotham, no intervention needed at this time.

## 2022-02-14 NOTE — ANESTHESIA CARE TRANSFER NOTE
Patient: Saleem Wilde    Procedure: Procedure(s):  Cystoscopy, left retrograde ureteroscopy pyelogram, with ureteral biopsies       Diagnosis: Stricture or kinking of ureter [N13.5]  Diagnosis Additional Information: No value filed.    Anesthesia Type:   General     Note:      Level of Consciousness: awake  Oxygen Supplementation: face mask  Level of Supplemental Oxygen (L/min / FiO2): 6  Independent Airway: airway patency satisfactory and stable    Vital Signs Stable: post-procedure vital signs reviewed and stable  Report to RN Given: handoff report given  Patient transferred to: PACU    Handoff Report: Identifed the Patient, Identified the Reponsible Provider, Reviewed the pertinent medical history, Discussed the surgical course, Reviewed Intra-OP anesthesia mangement and issues during anesthesia, Set expectations for post-procedure period and Allowed opportunity for questions and acknowledgement of understanding      Vitals:  Vitals Value Taken Time   /79 02/14/22 1000   Temp     Pulse 67 02/14/22 1001   Resp 22 02/14/22 1001   SpO2 100 % 02/14/22 1001   Vitals shown include unvalidated device data.    Electronically Signed By: LIBIA Clement CRNA  February 14, 2022  10:02 AM

## 2022-02-14 NOTE — ANESTHESIA PROCEDURE NOTES
Airway       Patient location during procedure: OR       Procedure Start/Stop Times: 2/14/2022 8:14 AM  Staff -        CRNA: Jocelynn Perez APRN CRNA       Performed By: CRNA  Consent for Airway        Urgency: elective  Indications and Patient Condition       Indications for airway management: kelton-procedural       Induction type:intravenous       Mask difficulty assessment: 2 - vent by mask + OA or adjuvant +/- NMBA    Final Airway Details       Final airway type: endotracheal airway       Successful airway: ETT - single  Endotracheal Airway Details        ETT size (mm): 8.0       Cuffed: yes       Successful intubation technique: direct laryngoscopy       DL Blade Type: MAC 4       Grade View of Cords: 1       Adjucts: stylet       Position: Right       Measured from: lips       Secured at (cm): 23       Bite block used: None    Post intubation assessment        Placement verified by: capnometry, equal breath sounds and chest rise        Number of attempts at approach: 1       Number of other approaches attempted: 0       Secured with: silk tape       Ease of procedure: easy       Dentition: Intact and Unchanged

## 2022-02-14 NOTE — OP NOTE
OPERATIVE REPORT - 2/14/22    PREOPERATIVE DIAGNOSIS: Left UPJ Obstruction, Left Hydronephrosis    POSTOPERATIVE DIAGNOSIS: Same    PROCEDURES PERFORMED:   Cystourethroscopy  Left Ureteroscopy  Left Ureteral Biopsy  Left Retrograde Pyelogram  Interpretation of Intraoperative Images    STAFF SURGEON: Javy Kang MD  RESIDENT: Mane Rogers MD    ANESTHESIA: General  ESTIMATED BLOOD LOSS: 1 ml  COMPLICATIONS: None.   SPECIMEN: Left renal pelvis washing, left ureteral biospy    TUBES/LINES/DRAINS: Existing left PNT    SIGNIFICANT FINDINGS:     Retrograde pyelogram with no flow proximal to left UPJ    Edematous, strictured region of UPJ, <1 cm in length, easily traversed with scope      BRIEF OPERATIVE INDICATIONS:   Saleem Wilde is a 46 year old male with history of left hydronephrosis, left UPJ obstruction, previously managed with indwelling ureteral stent, and subsequently with left PNT now for approximately one year, who presents for surgical evaluation for definitive management.  After a discussion of all risks, benefits, and alternatives, the patient elected to proceed with the above listed procedures.    DESCRIPTION OF PROCEDURE:   After informed consent was obtained, the patient was transported to the operating room & placed supine on the table. After adequate anesthesia was induced, the patient was placed in lithotomy and prepped and draped in the usual sterile fashion. A timeout was taken to confirm correct patient, procedure and laterality. Pre-operative IV antibiotics were administered and pneumatic compression devices were in place prior to the start of the procedure    A high definition fluoroscopic image was taken that confirmed a well-positioned left PNT.    A 22-Macedonian rigid cystoscope was inserted into a well-lubricated urethra. The urethra was unremarkable. The prostate was hypertrophied with a high riding bladder neck. Upon entry into the bladder, media was turbid. The  bladder was emptied and washed. There were no tumors or kidney stones. There was a large diverticula just lateral to the left ureteral orifice.    We turned our attention to the left ureter. With the aid of a 5 Fr open ended catheter, a guide wire was advanced up to the left renal pelvis. The wire was removed and a retrograde pyelogram was performed that demonstrated no passage of contrast proximal to the left UPJ, but an otherwise unremarkable retrograde pyelogram with no filling defects or areas of stricture.    A flexible ureteroscope was then advanced over the wire to the level of the UPJ. We noted edematous, abnormal tissue and a short stricture, <1 cm. The stricture was able to be passed with the scope, and in the renal pelvis the left PNT was visualized along with turbid media and PNT related edematous changes, but no suspicious lesions or other abnormalities. The disposable flexible ureteroscopic biopsy forceps were advanced through the scope, and several biopsies were taken and sent for pathologic analysis. Ureteroscopic evaluation demonstrated no residual bleeding following the biopsy. A left renal pelvis washing was also obtained and sent for cytology.    Pullback ureteroscopy was performed and demonstrated no significant ureteral injury or other abnormalities. Given the well-placed left PNT, we did not place a stent on the left side. The bladder was then drained and the patient was returned to the supine position. This concluded the procedure. There were no immediate complications. The patient was awakened from anesthesia and transferred to the PACU in condition.    POST-OP PLAN:  PACU, then discharge to home  5 Days of cipro and fluconazole  Virtual follow-up for path review, then plans for pyeloplasty if negative path on biopsy

## 2022-02-14 NOTE — DISCHARGE INSTRUCTIONS
Same-Day Surgery   Adult Discharge Orders & Instructions     For 24 hours after surgery:  1. Get plenty of rest.  A responsible adult must stay with you for at least 24 hours after you leave the hospital.   2. Pain medication can slow your reflexes. Do not drive or use heavy equipment.  If you have weakness or tingling, don't drive or use heavy equipment until this feeling goes away.  3. Mixing alcohol and pain medication can cause dizziness and slow your breathing. It can even be fatal. Do not drink alcohol while taking pain medication.  4. Avoid strenuous or risky activities.  Ask for help when climbing stairs.   5. You may feel lightheaded.  If so, sit for a few minutes before standing.  Have someone help you get up.   6. If you have nausea (feel sick to your stomach), drink only clear liquids such as apple juice, ginger ale, broth or 7-Up.  Rest may also help.  Be sure to drink enough fluids.  Move to a regular diet as you feel able. Take pain medications with a small amount of solid food, such as toast or crackers, to avoid nausea.   7. A slight fever is normal. Call the doctor if your fever is over 100 F (37.7 C) (taken under the tongue) or lasts longer than 24 hours.  8. You may have a dry mouth, muscle aches, trouble sleeping or a sore throat.  These symptoms should go away after 24 hours.  9. Do not make important or legal decisions.   Pain Management:      1. Take pain medication (if prescribed) for pain as directed by your physician.        2. WARNING: If the pain medication you have been prescribed contains Tylenol  (acetaminophen), DO NOT take additional doses of Tylenol (acetaminophen).     Call your doctor for any of the followin.  Signs of infection (fever, growing tenderness at the surgery site, severe pain, a large amount of drainage or bleeding, foul-smelling drainage, redness, swelling).    2.  It has been over 8 to 10 hours since surgery and you are still not able to urinate (pee).    3.   Headache for over 24 hours.    4.  Numbness, tingling or weakness the day after surgery (if you had spinal anesthesia).  To contact a doctor, call ____[phone]_____ or:      [phone] and ask for the Resident On Call for:          ______Urology________ (answered 24 hours a day)      Emergency Department:  [facility]: [phone]  [facility]: [phone]               Rev. 10/2014

## 2022-02-15 LAB
PATH REPORT.COMMENTS IMP SPEC: ABNORMAL
PATH REPORT.COMMENTS IMP SPEC: ABNORMAL
PATH REPORT.COMMENTS IMP SPEC: YES
PATH REPORT.FINAL DX SPEC: ABNORMAL
PATH REPORT.GROSS SPEC: ABNORMAL
PATH REPORT.MICROSCOPIC SPEC OTHER STN: ABNORMAL
PATH REPORT.RELEVANT HX SPEC: ABNORMAL

## 2022-02-15 NOTE — ANESTHESIA POSTPROCEDURE EVALUATION
Patient: Saleem Wilde    Procedure: Procedure(s):  Cystoscopy, left retrograde ureteroscopy pyelogram, with ureteral biopsies       Diagnosis:Stricture or kinking of ureter [N13.5]  Diagnosis Additional Information: No value filed.    Anesthesia Type:  General    Note:  Disposition: Outpatient   Postop Pain Control: Uneventful            Sign Out: Well controlled pain   PONV: No   Neuro/Psych: Uneventful            Sign Out: Acceptable/Baseline neuro status   Airway/Respiratory: Uneventful            Sign Out: Acceptable/Baseline resp. status   CV/Hemodynamics: Uneventful            Sign Out: Acceptable CV status; No obvious hypovolemia; No obvious fluid overload   Other NRE: NONE   DID A NON-ROUTINE EVENT OCCUR? No           Last vitals:  Vitals Value Taken Time   /89 02/14/22 1030   Temp 36.4  C (97.5  F) 02/14/22 1030   Pulse 65 02/14/22 1030   Resp 17 02/14/22 1031   SpO2 98 % 02/14/22 1039   Vitals shown include unvalidated device data.    Electronically Signed By: Yenifer Higginbotham MD  February 14, 2022  6:35 PM

## 2022-02-16 ENCOUNTER — TELEPHONE (OUTPATIENT)
Dept: UROLOGY | Facility: CLINIC | Age: 47
End: 2022-02-16
Payer: COMMERCIAL

## 2022-02-16 NOTE — TELEPHONE ENCOUNTER
[facility] Call Center    Phone Message    May a detailed message be left on voicemail: yes     Reason for Call: Patient had procedure done and would like to speak with care team about findings. Please reach out to patient. Thank you!    Action Taken: Message routed to:  [facility] Uro    Travel Screening: Not Applicable

## 2022-02-16 NOTE — TELEPHONE ENCOUNTER
Spoke to pt. Informed pt that results have not finalized yet and that his 3/25 appointment is to discuss results with provider. Pt verbalized understanding.     Lori Burk RN MSN

## 2022-02-17 LAB
PATH REPORT.COMMENTS IMP SPEC: NORMAL
PATH REPORT.COMMENTS IMP SPEC: NORMAL
PATH REPORT.FINAL DX SPEC: NORMAL
PATH REPORT.GROSS SPEC: NORMAL
PATH REPORT.MICROSCOPIC SPEC OTHER STN: NORMAL
PHOTO IMAGE: NORMAL

## 2022-02-24 ENCOUNTER — PREP FOR PROCEDURE (OUTPATIENT)
Dept: SURGERY | Facility: CLINIC | Age: 47
End: 2022-02-24
Payer: COMMERCIAL

## 2022-02-24 DIAGNOSIS — N13.5 UPJ (URETEROPELVIC JUNCTION) OBSTRUCTION: Primary | ICD-10-CM

## 2022-02-25 ENCOUNTER — HOSPITAL ENCOUNTER (OUTPATIENT)
Facility: CLINIC | Age: 47
End: 2022-02-25
Attending: UROLOGY | Admitting: UROLOGY

## 2022-02-25 DIAGNOSIS — Z11.59 ENCOUNTER FOR SCREENING FOR OTHER VIRAL DISEASES: Primary | ICD-10-CM

## 2022-03-22 ENCOUNTER — PRE VISIT (OUTPATIENT)
Dept: UROLOGY | Facility: CLINIC | Age: 47
End: 2022-03-22
Payer: COMMERCIAL

## 2022-03-22 NOTE — TELEPHONE ENCOUNTER
Reason for visit: Cysto/stent removal     Relevant information: pyelogram    Records/imaging/labs/orders: in epic    Pt called: no    At Rooming: maybe not cysto

## 2022-03-25 ENCOUNTER — TELEPHONE (OUTPATIENT)
Dept: UROLOGY | Facility: CLINIC | Age: 47
End: 2022-03-25

## 2022-03-25 NOTE — TELEPHONE ENCOUNTER
[facility]    Phone Message    May a detailed message be left on voicemail: yes     Reason for Call: Requesting Results   Name/type of test: pathology results  Date of test: 2/14/2022  Was test done at a location other than [facility] (Please fill in the location if not [facility])?: No      Action Taken: Message routed to:  [facility] uro    Travel Screening: Not Applicable                                                                      
Spoke to pt. Reviewed results with pt and reiterated that pt has an appointment scheduled on 4/8/22 to discuss with provider. Pt verbalized understanding.     Lori Burk RN MSN    
patient stated/baby had been given bottles of formula while under  phototherapy/observation

## 2022-04-08 ENCOUNTER — OFFICE VISIT (OUTPATIENT)
Dept: UROLOGY | Facility: CLINIC | Age: 47
End: 2022-04-08
Payer: COMMERCIAL

## 2022-04-08 VITALS
SYSTOLIC BLOOD PRESSURE: 161 MMHG | DIASTOLIC BLOOD PRESSURE: 100 MMHG | WEIGHT: 315 LBS | HEART RATE: 90 BPM | BODY MASS INDEX: 49.44 KG/M2 | HEIGHT: 67 IN

## 2022-04-08 DIAGNOSIS — N13.5 UPJ OBSTRUCTION, ACQUIRED: Primary | ICD-10-CM

## 2022-04-08 PROBLEM — S31.109A CHRONIC ABDOMINAL WOUND INFECTION: Status: ACTIVE | Noted: 2018-07-11

## 2022-04-08 PROBLEM — N12 PYELONEPHRITIS OF LEFT KIDNEY: Status: ACTIVE | Noted: 2021-02-04

## 2022-04-08 PROBLEM — A49.02 MRSA (METHICILLIN RESISTANT STAPHYLOCOCCUS AUREUS): Status: ACTIVE | Noted: 2019-02-08

## 2022-04-08 PROBLEM — L08.9 CHRONIC ABDOMINAL WOUND INFECTION: Status: ACTIVE | Noted: 2018-07-11

## 2022-04-08 PROBLEM — U07.1 COVID-19 VIRUS INFECTION: Status: ACTIVE | Noted: 2020-11-18

## 2022-04-08 PROBLEM — Z91.199 NON-COMPLIANT PATIENT: Status: ACTIVE | Noted: 2017-05-06

## 2022-04-08 PROBLEM — N13.30 HYDRONEPHROSIS OF LEFT KIDNEY: Status: ACTIVE | Noted: 2020-12-09

## 2022-04-08 PROBLEM — Z87.19 HISTORY OF SMALL BOWEL OBSTRUCTION: Status: ACTIVE | Noted: 2022-02-04

## 2022-04-08 LAB
ALBUMIN UR-MCNC: 100 MG/DL
APPEARANCE UR: ABNORMAL
BACTERIA #/AREA URNS HPF: ABNORMAL /HPF
BILIRUB UR QL STRIP: NEGATIVE
COLOR UR AUTO: YELLOW
GLUCOSE UR STRIP-MCNC: >499 MG/DL
HGB UR QL STRIP: ABNORMAL
KETONES UR STRIP-MCNC: NEGATIVE MG/DL
LEUKOCYTE ESTERASE UR QL STRIP: ABNORMAL
NITRATE UR QL: NEGATIVE
PH UR STRIP: 5 [PH] (ref 5–7)
RBC URINE: >182 /HPF
SP GR UR STRIP: 1.03 (ref 1–1.03)
SQUAMOUS EPITHELIAL: 4 /HPF
UROBILINOGEN UR STRIP-MCNC: 2 MG/DL
WBC CLUMPS #/AREA URNS HPF: PRESENT /HPF
WBC URINE: >182 /HPF
YEAST #/AREA URNS HPF: ABNORMAL /HPF

## 2022-04-08 PROCEDURE — 99213 OFFICE O/P EST LOW 20 MIN: CPT | Performed by: UROLOGY

## 2022-04-08 PROCEDURE — 87086 URINE CULTURE/COLONY COUNT: CPT | Mod: 90 | Performed by: PATHOLOGY

## 2022-04-08 PROCEDURE — 87106 FUNGI IDENTIFICATION YEAST: CPT | Mod: 90 | Performed by: PATHOLOGY

## 2022-04-08 PROCEDURE — 99000 SPECIMEN HANDLING OFFICE-LAB: CPT | Performed by: PATHOLOGY

## 2022-04-08 PROCEDURE — 87088 URINE BACTERIA CULTURE: CPT | Mod: 90 | Performed by: PATHOLOGY

## 2022-04-08 PROCEDURE — 81001 URINALYSIS AUTO W/SCOPE: CPT | Performed by: PATHOLOGY

## 2022-04-08 RX ORDER — BLOOD-GLUCOSE METER
1 EACH MISCELLANEOUS
COMMUNITY
Start: 2021-02-05

## 2022-04-08 RX ORDER — ORAL SEMAGLUTIDE 7 MG/1
TABLET ORAL
COMMUNITY
Start: 2022-01-13 | End: 2023-04-10

## 2022-04-08 RX ORDER — BLOOD SUGAR DIAGNOSTIC
STRIP MISCELLANEOUS
COMMUNITY
Start: 2021-02-05

## 2022-04-08 RX ORDER — LANCETS
EACH MISCELLANEOUS
COMMUNITY
Start: 2021-02-05

## 2022-04-08 RX ORDER — ATORVASTATIN CALCIUM 40 MG/1
40 TABLET, FILM COATED ORAL
COMMUNITY
Start: 2021-10-26 | End: 2023-04-10

## 2022-04-08 ASSESSMENT — PAIN SCALES - GENERAL: PAINLEVEL: NO PAIN (0)

## 2022-04-08 NOTE — PROGRESS NOTES
"ASSESSMENT AND PLAN  Left  ureteropelvic junction obstruction.  Left percutaneous nephrostomy tube in place.    Complicated by:    Diabetes mellitus    Large midline ventral hernia.  Started as umbilical hernia, but he had complications post op and had to have wound vac.  He has persistent hernia with possible repair when his weight is down to #225.    Morbid obesity.  BMI 45 currently.        Plan    Left robot pyeloplasty is pending    New percutaneous nephrostomy tube bag today.  _________________________________________________________    CHIEF COMPLAINT  It was my pleasure to see aSleem Wilde who is a 45 year old male here for evaluation of possible ureteropelvic junction obstruction.    HPI  His story began in November when he was diagnosed with COVID. As part of the workup for this, he underwent abdominal imaging and distention of the left renal pelvis was noted.  He denies any previous pain.  He was seen by Dr. Murcia at [facility] in [place].  It sounds like a stent was placed at some point, but he eventually ended up with a left nephrostomy tube, which he still has in place now.  Dr. Murcia went over treatment options, but Mr. Wilde has a number of issues, which make him a difficult surgical candidate, most prominently his morbid obesity and his large midline ventral hernia.  Because of this, Dr. Murcia referred him here to the [facility].    Since the nephrostomy tube was placed, Mr. Wilde denies any problems.      He denies any family history of this problem.      Per 5/25/21 Dr Murcia note  \"Saleem Wilde is a 45 year old male PMH of IDDM, obesity with large midline ventral hernia with complicated urologic history of left UPJ obstruction. In short, he initially presented to the urology department in November 2020 and was noted to have a dilated left renal pelvis full of debris and underwent stent placement at that time. He subsequently underwent ureteroscopy in January 2021 and " "again noted a large ball of white/gray debris. He was noted to have a narrowing of the left UPJ at that time as well (see fluoroscopic images below). Subsequently underwent a left percutaneous nephrostomy tube placement with twice daily irrigations and eventually was able to clear all of the debris. I performed a repeat ureteroscopy in March 2021 and noted the renal pelvis was clear and again noted the narrowing at the left UPJ. He returns today with a Lasix renogram to prove obstruction. Of note the renogram was performed with the left nephrostomy tube clamped.\"    4/19/21 Renogram  IMPRESSION:  1. Split renal function 73.7% Right, 26.3% Left.  2. Accumulation of activity within the left kidney which clears to some degree on post-Lasix study. The finding suggests fairly high-grade left-sided obstruction.  3. Mild patulous right renal collecting system which clears normally. No obstruction on the right.\"    Per 12/9/16 Dr Westbrook note  \"Multiple contraindications to surgical intervention.\"     Referred to PCP for weight management, diabetes control, continued smoking cessation.  Encourage to wear binder when out of bed.  Patient must have BMI <35, Today 60\"      Kidney stone history: Denies  Urinary tract infection history: Denies    7/23/21 Creatinine 0.77    Drinks 6 beers 2x per month.  10 years ago would drink 12 beers per day.    9/24/21:  No new symptoms.  Percutaneous nephrostomy tube is very bothersome for him.    4/8/22  Doing well and denies any issues other then leaking from percutaneous nephrostomy tube bag.        10/5/21 Lasix Renogram   I reviewed the radiologic images and report from this radiologic exam.  My independent interpretation is:    No images    Radiologist Impression  FINDINGS: There is asymmetrically diminished perfusion to the left kidney. The split function is 30% on the left and 70% on the right.     Following Lasix administration, the T half max on the left is 12 minutes. T half max on " "the right is 17 minutes following Lasix administration.     IMPRESSION:   1.  Asymmetric decreased uptake and perfusion of radiotracer in the left kidney.   2.  Normal left renal excretion following Lasix administration.   3.  T half max on the right is within the indeterminate range.         3/12/21 CT Abdomen/Pelvis without contrast   Radiologist Impression  1. A percutaneous left nephrostomy tube, and a double-J left ureteral catheter remain in place. Mild-to-moderate perinephric stranding similar. No hydronephrosis.   2. A complex large ventral hernia with herniation of colon and small bowel loops, without evidence of bowel obstruction.         PHYSICAL EXAM  Vitals:    04/08/22 0801   BP: (!) 161/100   Pulse: 90   Weight: 145.2 kg (320 lb)   Height: 1.702 m (5' 7\")     Wt Readings from Last 3 Encounters:   04/08/22 145.2 kg (320 lb)   02/14/22 (!) 152.6 kg (336 lb 6.8 oz)   08/20/21 138.3 kg (305 lb)       This visit was done via phone at the patient's request.  Time spent on phone call: 5 min  Time spent on pre-visit work, post visit work, and documentation outside of phone call time on day of visit: 2 min  Total time: 7 min          CC  Patient Care Team:  Linh Galindo MD as PCP - General (Internal Medicine)  Dusty Westbrook MD as MD (Surgery)  Javy Kang MD as MD (Urology)  Javy Kang MD as Assigned Surgical Provider  Yaquelin Jimenez, RN as Specialty Care Coordinator (Oncology)  Linh Galindo MD as Referring Physician (Internal Medicine)  FABIEN CASTRO    Copy to patient  [address]  [address] [facility] [address]    "

## 2022-04-08 NOTE — LETTER
"4/8/2022       RE: Saleem Wilde  [address] [facility] [address]     Dear Colleague,    Thank you for referring your patient, Saleem Wilde, to the [facility] [place] at [facility]. Please see a copy of my visit note below.    ASSESSMENT AND PLAN  Left  ureteropelvic junction obstruction.  Left percutaneous nephrostomy tube in place.    Complicated by:    Diabetes mellitus    Large midline ventral hernia.  Started as umbilical hernia, but he had complications post op and had to have wound vac.  He has persistent hernia with possible repair when his weight is down to #225.    Morbid obesity.  BMI 45 currently.        Plan    Left robot pyeloplasty is pending    New percutaneous nephrostomy tube bag today.  _________________________________________________________    CHIEF COMPLAINT  It was my pleasure to see Saleem Wilde who is a 45 year old male here for evaluation of possible ureteropelvic junction obstruction.    HPI  His story began in November when he was diagnosed with COVID. As part of the workup for this, he underwent abdominal imaging and distention of the left renal pelvis was noted.  He denies any previous pain.  He was seen by Dr. Murcia at [facility] in [place].  It sounds like a stent was placed at some point, but he eventually ended up with a left nephrostomy tube, which he still has in place now.  Dr. Murcia went over treatment options, but Mr. Wilde has a number of issues, which make him a difficult surgical candidate, most prominently his morbid obesity and his large midline ventral hernia.  Because of this, Dr. Murcia referred him here to the [facility].    Since the nephrostomy tube was placed, Mr. Wilde denies any problems.      He denies any family history of this problem.      Per 5/25/21 Dr Murcia note  \"Saleem Wilde is a 45 year old male PMH of IDDM, obesity with large midline " "ventral hernia with complicated urologic history of left UPJ obstruction. In short, he initially presented to the urology department in November 2020 and was noted to have a dilated left renal pelvis full of debris and underwent stent placement at that time. He subsequently underwent ureteroscopy in January 2021 and again noted a large ball of white/gray debris. He was noted to have a narrowing of the left UPJ at that time as well (see fluoroscopic images below). Subsequently underwent a left percutaneous nephrostomy tube placement with twice daily irrigations and eventually was able to clear all of the debris. I performed a repeat ureteroscopy in March 2021 and noted the renal pelvis was clear and again noted the narrowing at the left UPJ. He returns today with a Lasix renogram to prove obstruction. Of note the renogram was performed with the left nephrostomy tube clamped.\"    4/19/21 Renogram  IMPRESSION:  1. Split renal function 73.7% Right, 26.3% Left.  2. Accumulation of activity within the left kidney which clears to some degree on post-Lasix study. The finding suggests fairly high-grade left-sided obstruction.  3. Mild patulous right renal collecting system which clears normally. No obstruction on the right.\"    Per 12/9/16 Dr Westbrook note  \"Multiple contraindications to surgical intervention.\"     Referred to PCP for weight management, diabetes control, continued smoking cessation.  Encourage to wear binder when out of bed.  Patient must have BMI <35, Today 60\"      Kidney stone history: Denies  Urinary tract infection history: Denies    7/23/21 Creatinine 0.77    Drinks 6 beers 2x per month.  10 years ago would drink 12 beers per day.    9/24/21:  No new symptoms.  Percutaneous nephrostomy tube is very bothersome for him.    4/8/22  Doing well and denies any issues other then leaking from percutaneous nephrostomy tube bag.        10/5/21 Lasix Renogram   I reviewed the radiologic images and report from this " "radiologic exam.  My independent interpretation is:    No images    Radiologist Impression  FINDINGS: There is asymmetrically diminished perfusion to the left kidney. The split function is 30% on the left and 70% on the right.     Following Lasix administration, the T half max on the left is 12 minutes. T half max on the right is 17 minutes following Lasix administration.     IMPRESSION:   1.  Asymmetric decreased uptake and perfusion of radiotracer in the left kidney.   2.  Normal left renal excretion following Lasix administration.   3.  T half max on the right is within the indeterminate range.         3/12/21 CT Abdomen/Pelvis without contrast   Radiologist Impression  1. A percutaneous left nephrostomy tube, and a double-J left ureteral catheter remain in place. Mild-to-moderate perinephric stranding similar. No hydronephrosis.   2. A complex large ventral hernia with herniation of colon and small bowel loops, without evidence of bowel obstruction.         PHYSICAL EXAM  Vitals:    04/08/22 0801   BP: (!) 161/100   Pulse: 90   Weight: 145.2 kg (320 lb)   Height: 1.702 m (5' 7\")     Wt Readings from Last 3 Encounters:   04/08/22 145.2 kg (320 lb)   02/14/22 (!) 152.6 kg (336 lb 6.8 oz)   08/20/21 138.3 kg (305 lb)       This visit was done via phone at the patient's request.  Time spent on phone call: 5 min  Time spent on pre-visit work, post visit work, and documentation outside of phone call time on day of visit: 2 min  Total time: 7 min          CC  Patient Care Team:  Linh Galindo MD as PCP - General (Internal Medicine)  Dusty Westbrook MD as MD (Surgery)  Javy Kang MD as MD (Urology)  Javy Kang MD as Assigned Surgical Provider  Yaquelin Jimenez, RN as Specialty Care Coordinator (Oncology)  Linh Galindo MD as Referring Physician (Internal Medicine)  FABIEN CASTRO    Copy to patient  [address]  [address] [facility] [address]      Javy Kang, " MD

## 2022-04-08 NOTE — PATIENT INSTRUCTIONS
Urine sent for UA/UC (nephrostomy and voided urine)    You will be contacted regarding your surgery.    It was a pleasure meeting with you today.  Thank you for allowing me and my team the privilege of caring for you today.  YOU are the reason we are here, and I truly hope we provided you with the excellent service you deserve.  Please let us know if there is anything else we can do for you so that we can be sure you are leaving completely satisfied with your care experience.        Perla Dowling, CMA

## 2022-04-08 NOTE — NURSING NOTE
Chief Complaint   Patient presents with     Surgical Followup     Post op.  S/P Left Retrograde pyelogram/Ureteral Biopsy/Ureteroscopy on 2/14     Perla Dowling, CMA

## 2022-04-10 LAB
BACTERIA UR CULT: ABNORMAL

## 2022-08-17 NOTE — OR NURSING
Abnormal UA. Has surgery 2/14  Received: Today  Seda Escalante, Yaquelin Frost, RN  Cc: P [facility]  Hi Yaquelin,     Pt had an abnormal UA at [facility] on 2/4-See CE. Can you please F/U with him?     He wants to know if he needs to take antibiotics before surgery (scheduled on 2/14). He also wants to know when he will be able to return to work after surgery. He is a cook/.     Thanks so much.     Seda Escalante, RN, BSN   Preanesthesia Screening   [phone]     
gradual onset

## 2022-12-27 ENCOUNTER — MEDICAL CORRESPONDENCE (OUTPATIENT)
Dept: HEALTH INFORMATION MANAGEMENT | Facility: CLINIC | Age: 47
End: 2022-12-27

## 2022-12-28 ENCOUNTER — TRANSCRIBE ORDERS (OUTPATIENT)
Dept: OTHER | Age: 47
End: 2022-12-28

## 2022-12-28 DIAGNOSIS — N13.5 UPJ OBSTRUCTION, ACQUIRED: Primary | ICD-10-CM

## 2023-01-06 ENCOUNTER — PREP FOR PROCEDURE (OUTPATIENT)
Dept: UROLOGY | Facility: CLINIC | Age: 48
End: 2023-01-06

## 2023-01-20 ENCOUNTER — TELEPHONE (OUTPATIENT)
Dept: UROLOGY | Facility: CLINIC | Age: 48
End: 2023-01-20

## 2023-01-20 NOTE — TELEPHONE ENCOUNTER
Changed patient appointment to 1/27/23 and in person per patient request.  Patient has already had lasix renogram.  This author's direct line provided for future questions/concerns.    Dave Freeman, RN  RN Care Coordinator - Urology

## 2023-01-27 ENCOUNTER — PREP FOR PROCEDURE (OUTPATIENT)
Dept: UROLOGY | Facility: CLINIC | Age: 48
End: 2023-01-27

## 2023-01-27 ENCOUNTER — OFFICE VISIT (OUTPATIENT)
Dept: UROLOGY | Facility: CLINIC | Age: 48
End: 2023-01-27
Payer: COMMERCIAL

## 2023-01-27 ENCOUNTER — ALLIED HEALTH/NURSE VISIT (OUTPATIENT)
Dept: UROLOGY | Facility: CLINIC | Age: 48
End: 2023-01-27
Payer: COMMERCIAL

## 2023-01-27 VITALS — WEIGHT: 300 LBS | HEIGHT: 67 IN | BODY MASS INDEX: 47.09 KG/M2

## 2023-01-27 DIAGNOSIS — N28.9 NON-FUNCTIONING KIDNEY: Primary | ICD-10-CM

## 2023-01-27 DIAGNOSIS — N13.5 UPJ OBSTRUCTION, ACQUIRED: Primary | ICD-10-CM

## 2023-01-27 LAB
ALBUMIN UR-MCNC: 10 MG/DL
APPEARANCE UR: ABNORMAL
BILIRUB UR QL STRIP: NEGATIVE
COLOR UR AUTO: YELLOW
GLUCOSE UR STRIP-MCNC: 150 MG/DL
HGB UR QL STRIP: ABNORMAL
KETONES UR STRIP-MCNC: NEGATIVE MG/DL
LEUKOCYTE ESTERASE UR QL STRIP: ABNORMAL
MUCOUS THREADS #/AREA URNS LPF: PRESENT /LPF
NITRATE UR QL: NEGATIVE
PH UR STRIP: 6.5 [PH] (ref 5–7)
RBC URINE: 65 /HPF
SP GR UR STRIP: 1.02 (ref 1–1.03)
SQUAMOUS EPITHELIAL: 1 /HPF
TRANSITIONAL EPI: <1 /HPF
UROBILINOGEN UR STRIP-MCNC: NORMAL MG/DL
WBC URINE: 104 /HPF
YEAST #/AREA URNS HPF: ABNORMAL /HPF
YEAST #/AREA URNS HPF: ABNORMAL /HPF

## 2023-01-27 PROCEDURE — 81001 URINALYSIS AUTO W/SCOPE: CPT | Performed by: PATHOLOGY

## 2023-01-27 PROCEDURE — 99000 SPECIMEN HANDLING OFFICE-LAB: CPT | Performed by: PATHOLOGY

## 2023-01-27 PROCEDURE — 99207 PR NO CHARGE LOS: CPT

## 2023-01-27 PROCEDURE — 99214 OFFICE O/P EST MOD 30 MIN: CPT | Performed by: UROLOGY

## 2023-01-27 PROCEDURE — 87086 URINE CULTURE/COLONY COUNT: CPT | Mod: 90 | Performed by: PATHOLOGY

## 2023-01-27 PROCEDURE — 87106 FUNGI IDENTIFICATION YEAST: CPT | Mod: 90 | Performed by: PATHOLOGY

## 2023-01-27 RX ORDER — CEFAZOLIN SODIUM IN 0.9 % NACL 3 G/100 ML
3 INTRAVENOUS SOLUTION, PIGGYBACK (ML) INTRAVENOUS
Status: CANCELLED | OUTPATIENT
Start: 2023-01-27

## 2023-01-27 RX ORDER — HEPARIN SODIUM 5000 [USP'U]/.5ML
5000 INJECTION, SOLUTION INTRAVENOUS; SUBCUTANEOUS
Status: CANCELLED | OUTPATIENT
Start: 2023-01-27

## 2023-01-27 RX ORDER — SULFAMETHOXAZOLE/TRIMETHOPRIM 800-160 MG
1 TABLET ORAL 2 TIMES DAILY
Qty: 6 TABLET | Refills: 0 | Status: SHIPPED | OUTPATIENT
Start: 2023-01-27 | End: 2023-04-10

## 2023-01-27 RX ORDER — FLUCONAZOLE 100 MG/1
200 TABLET ORAL DAILY
Qty: 6 TABLET | Refills: 0 | Status: SHIPPED | OUTPATIENT
Start: 2023-01-27 | End: 2023-01-30

## 2023-01-27 RX ORDER — CEFAZOLIN SODIUM IN 0.9 % NACL 3 G/100 ML
3 INTRAVENOUS SOLUTION, PIGGYBACK (ML) INTRAVENOUS SEE ADMIN INSTRUCTIONS
Status: CANCELLED | OUTPATIENT
Start: 2023-01-27

## 2023-01-27 ASSESSMENT — PAIN SCALES - GENERAL: PAINLEVEL: NO PAIN (0)

## 2023-01-27 NOTE — PROGRESS NOTES
ASSESSMENT AND PLAN  Left  ureteropelvic junction obstruction.    Left percutaneous nephrostomy tube in place.    Complicated by:    Diabetes mellitus    Large midline ventral hernia.  Started as umbilical hernia, but he had complications post op and had to have wound vac.  He has persistent hernia with possible repair when his weight is down to #225.    Morbid obesity.  BMI 45 currently.    We covered surgical risks which include but are not limited to heart attack, stroke, blood clot in the legs or lungs, death, injury to surrounding organs (intestine, liver, spleen, pancreas, lung, muscles, nerves), hernias, loss of sensation around incisions, decreased renal function, and infection.  We discussed the risks of blood transfusion including HIV and hepatitis.  Additional procedures may be necessary in the perioperative period. There are other additional unexpected outcomes which may occur.    We once again covered how his hernia may greatly complicate his surgery and post operative recovery.  We will do all that we can to avoid the hernia and minimize complications.  He is aware that despite these efforts he may suffer a major complication.    Plan    Left robot nephrectomy in about 6 weeks.  Likely retroperitonal approach.  I asked for General Surgery to be available, but most likely they will not need to do any significant work.  _________________________________________________________    HPI  His story began in November 2021 when he was diagnosed with COVID. As part of the workup for this, he underwent abdominal imaging and distention of the left renal pelvis was noted.  He denies any previous pain.  He was seen by Dr. Murcia at [facility] in [place].  It sounds like a stent was placed at some point, but he eventually ended up with a left nephrostomy tube, which he still has in place now.  Dr. Murcia went over treatment options, but Mr. Wilde has a number of issues, which make him a difficult surgical  "candidate, most prominently his morbid obesity and his large midline ventral hernia.  Because of this, Dr. Murcia referred him here to the [facility].    Since the nephrostomy tube was placed, Mr. Wilde denies any problems.      He denies any family history of this problem.      Per 5/25/21 Dr Murcia note  \"Saleem Wilde is a 45 year old male PMH of IDDM, obesity with large midline ventral hernia with complicated urologic history of left UPJ obstruction. In short, he initially presented to the urology department in November 2020 and was noted to have a dilated left renal pelvis full of debris and underwent stent placement at that time. He subsequently underwent ureteroscopy in January 2021 and again noted a large ball of white/gray debris. He was noted to have a narrowing of the left UPJ at that time as well (see fluoroscopic images below). Subsequently underwent a left percutaneous nephrostomy tube placement with twice daily irrigations and eventually was able to clear all of the debris. I performed a repeat ureteroscopy in March 2021 and noted the renal pelvis was clear and again noted the narrowing at the left UPJ. He returns today with a Lasix renogram to prove obstruction. Of note the renogram was performed with the left nephrostomy tube clamped.\"    4/19/21 Renogram  IMPRESSION:  1. Split renal function 73.7% Right, 26.3% Left.  2. Accumulation of activity within the left kidney which clears to some degree on post-Lasix study. The finding suggests fairly high-grade left-sided obstruction.  3. Mild patulous right renal collecting system which clears normally. No obstruction on the right.\"    Per 12/9/16 Dr Westbrook note  \"Multiple contraindications to surgical intervention.\"     Referred to PCP for weight management, diabetes control, continued smoking cessation.  Encourage to wear binder when out of bed.  Patient must have BMI <35, Today 60\"      Kidney stone history: Denies  Urinary tract " infection history: Denies    7/23/21 Creatinine 0.77    Drinks 6 beers 2x per month.  10 years ago would drink 12 beers per day.    9/24/21:  No new symptoms.  Percutaneous nephrostomy tube is very bothersome for him.    4/8/22  Doing well and denies any issues other then leaking from percutaneous nephrostomy tube bag.    1/27/23  He very much wants the percutaneous nephrostomy tube and a nephrectomy.    1/19/23 Lasix Renogram   Flow phase of the study shows evidence of severely diminished perfusion to the left kidney. The split function is 16% on the left and 84% on the right.     Function phase of the study shows evidence of markedly decreased extraction on the left.     Following Lasix administration there is evidence of borderline delayed washout of activity from the right renal pelvis with T  1/2 time calculated to 17 minutes. Half time is not reached on the left. (normal < 10 minutes, borderline normal between 10 and 15 minutes, borderline abnormal between 15 and 20 minutes and abnormal > 20 minutes)     IMPRESSION:   1.  Asymmetric decreased uptake and extraction of radiotracer in the left kidney.   2.  T 1/2 on the right is borderline abnormal at 17 minutes, similar to prior.   3.  T 1/2 on the left abnormal at greater than 20 minutes, consistent with obstructive uropathy.         10/5/21 Lasix Renogram   I reviewed the radiologic images and report from this radiologic exam.  My independent interpretation is:    No images    Radiologist Impression  FINDINGS: There is asymmetrically diminished perfusion to the left kidney. The split function is 30% on the left and 70% on the right.     Following Lasix administration, the T half max on the left is 12 minutes. T half max on the right is 17 minutes following Lasix administration.     IMPRESSION:   1.  Asymmetric decreased uptake and perfusion of radiotracer in the left kidney.   2.  Normal left renal excretion following Lasix administration.   3.  T half max on  "the right is within the indeterminate range.         3/12/21 CT Abdomen/Pelvis without contrast   Radiologist Impression  1. A percutaneous left nephrostomy tube, and a double-J left ureteral catheter remain in place. Mild-to-moderate perinephric stranding similar. No hydronephrosis.   2. A complex large ventral hernia with herniation of colon and small bowel loops, without evidence of bowel obstruction.         PHYSICAL EXAM  Vitals:    01/27/23 0910   Weight: 136.1 kg (300 lb)   Height: 1.702 m (5' 7\")     Wt Readings from Last 3 Encounters:   01/27/23 136.1 kg (300 lb)   04/08/22 145.2 kg (320 lb)   02/14/22 (!) 152.6 kg (336 lb 6.8 oz)           CC  Patient Care Team:  Linh Galindo MD as PCP - General (Internal Medicine)  Dusty Westbrook MD as MD (Surgery)  Javy Kang MD as MD (Urology)  Javy Kang MD as Assigned Surgical Provider  Yaquelin Jimenez, RN as Specialty Care Coordinator (Oncology)  Linh Galindo MD as Referring Physician (Internal Medicine)  FABIEN CASTRO    Copy to patient  DOMINIC LETICIA HIGHTOWER  [address] [facility] [id number]    "

## 2023-01-27 NOTE — PROGRESS NOTES
Pre Op Teaching Flowsheet       Pre and Post op Patient Education  Relevant Diagnosis:  Left UPJ obstruction  Surgical procedure:  Left robot-assisted nephrectomy  Teaching Topic:  Pre and post op teaching  Person Involved in teaching: Yes    Motivation Level:  Asks Questions: Yes  Eager to Learn: Yes  Cooperative: Yes  Receptive (willing/able to accept information):  Yes    Patient demonstrates understanding of the following:  Date of surgery:  Will call  Location of surgery:  Will call  History and Physical and any other testing necessary prior to surgery: Yes  Required time line for completion of History and Physical and any pre-op testing: Yes    Patient demonstrates understanding of the following:  Pre-op bowel prep:  N/A  Pre-op showering/scrub information with PCMX Soap: Yes  Blood thinner medications discussed and when to stop (if applicable):  N/A  Discussed no visitor's at this time due to increase Covid-19 cases and how we need to make sure everyone stays safe.    Infection Prevention:   Patient demonstrates understanding of the following:  Surgical procedure site care taught: Yes  Signs and symptoms of infection taught: Yes      Post-op follow-up:  Discussed how to contact the hospital, nurse, and clinic scheduling staff if necessary. (See packet information)    Instructional materials used/given/mailed:  [place] [facility] Packet, post op teaching sheet, Map, Soap, and with the arrival/location information to come closer to the surgery date.    Surgical instructions packet given to patient in office:  N/A    Follow up: Discussed arranging for someone to drive you home. ( No public transportation)  Someone needed to stay the first twenty hours after surgery: Yes     referral: no     home:  yes    Care Giver:  yes    PCP:  yes

## 2023-01-27 NOTE — LETTER
1/27/2023       RE: Saleem Wilde  [address] [place] [id number]     Dear Colleague,    Thank you for referring your patient, Saleem Wilde, to the [facility] [place] at [facility]. Please see a copy of my visit note below.    ASSESSMENT AND PLAN  Left  ureteropelvic junction obstruction.    Left percutaneous nephrostomy tube in place.    Complicated by:    Diabetes mellitus    Large midline ventral hernia.  Started as umbilical hernia, but he had complications post op and had to have wound vac.  He has persistent hernia with possible repair when his weight is down to #225.    Morbid obesity.  BMI 45 currently.    We covered surgical risks which include but are not limited to heart attack, stroke, blood clot in the legs or lungs, death, injury to surrounding organs (intestine, liver, spleen, pancreas, lung, muscles, nerves), hernias, loss of sensation around incisions, decreased renal function, and infection.  We discussed the risks of blood transfusion including HIV and hepatitis.  Additional procedures may be necessary in the perioperative period. There are other additional unexpected outcomes which may occur.    We once again covered how his hernia may greatly complicate his surgery and post operative recovery.  We will do all that we can to avoid the hernia and minimize complications.  He is aware that despite these efforts he may suffer a major complication.    Plan    Left robot nephrectomy in about 6 weeks.  Likely retroperitonal approach.  I asked for General Surgery to be available, but most likely they will not need to do any significant work.  _________________________________________________________    HPI  His story began in November 2021 when he was diagnosed with COVID. As part of the workup for this, he underwent abdominal imaging and distention of the left renal pelvis was noted.  He denies any previous pain.  He  "was seen by Dr. Murcia at [facility] in [place].  It sounds like a stent was placed at some point, but he eventually ended up with a left nephrostomy tube, which he still has in place now.  Dr. Murcia went over treatment options, but Mr. Wilde has a number of issues, which make him a difficult surgical candidate, most prominently his morbid obesity and his large midline ventral hernia.  Because of this, Dr. Murcia referred him here to the [facility].    Since the nephrostomy tube was placed, Mr. Wilde denies any problems.      He denies any family history of this problem.      Per 5/25/21 Dr Murcia note  \"Saleem Wilde is a 45 year old male PMH of IDDM, obesity with large midline ventral hernia with complicated urologic history of left UPJ obstruction. In short, he initially presented to the urology department in November 2020 and was noted to have a dilated left renal pelvis full of debris and underwent stent placement at that time. He subsequently underwent ureteroscopy in January 2021 and again noted a large ball of white/gray debris. He was noted to have a narrowing of the left UPJ at that time as well (see fluoroscopic images below). Subsequently underwent a left percutaneous nephrostomy tube placement with twice daily irrigations and eventually was able to clear all of the debris. I performed a repeat ureteroscopy in March 2021 and noted the renal pelvis was clear and again noted the narrowing at the left UPJ. He returns today with a Lasix renogram to prove obstruction. Of note the renogram was performed with the left nephrostomy tube clamped.\"    4/19/21 Renogram  IMPRESSION:  1. Split renal function 73.7% Right, 26.3% Left.  2. Accumulation of activity within the left kidney which clears to some degree on post-Lasix study. The finding suggests fairly high-grade left-sided obstruction.  3. Mild patulous right renal collecting system which clears normally. No obstruction on the " "right.\"    Per 12/9/16 Dr Westbrook note  \"Multiple contraindications to surgical intervention.\"     Referred to PCP for weight management, diabetes control, continued smoking cessation.  Encourage to wear binder when out of bed.  Patient must have BMI <35, Today 60\"      Kidney stone history: Denies  Urinary tract infection history: Denies    7/23/21 Creatinine 0.77    Drinks 6 beers 2x per month.  10 years ago would drink 12 beers per day.    9/24/21:  No new symptoms.  Percutaneous nephrostomy tube is very bothersome for him.    4/8/22  Doing well and denies any issues other then leaking from percutaneous nephrostomy tube bag.    1/27/23  He very much wants the percutaneous nephrostomy tube and a nephrectomy.    1/19/23 Lasix Renogram   Flow phase of the study shows evidence of severely diminished perfusion to the left kidney. The split function is 16% on the left and 84% on the right.     Function phase of the study shows evidence of markedly decreased extraction on the left.     Following Lasix administration there is evidence of borderline delayed washout of activity from the right renal pelvis with T  1/2 time calculated to 17 minutes. Half time is not reached on the left. (normal < 10 minutes, borderline normal between 10 and 15 minutes, borderline abnormal between 15 and 20 minutes and abnormal > 20 minutes)     IMPRESSION:   1.  Asymmetric decreased uptake and extraction of radiotracer in the left kidney.   2.  T 1/2 on the right is borderline abnormal at 17 minutes, similar to prior.   3.  T 1/2 on the left abnormal at greater than 20 minutes, consistent with obstructive uropathy.         10/5/21 Lasix Renogram   I reviewed the radiologic images and report from this radiologic exam.  My independent interpretation is:    No images    Radiologist Impression  FINDINGS: There is asymmetrically diminished perfusion to the left kidney. The split function is 30% on the left and 70% on the right.     Following Lasix " "administration, the T half max on the left is 12 minutes. T half max on the right is 17 minutes following Lasix administration.     IMPRESSION:   1.  Asymmetric decreased uptake and perfusion of radiotracer in the left kidney.   2.  Normal left renal excretion following Lasix administration.   3.  T half max on the right is within the indeterminate range.         3/12/21 CT Abdomen/Pelvis without contrast   Radiologist Impression  1. A percutaneous left nephrostomy tube, and a double-J left ureteral catheter remain in place. Mild-to-moderate perinephric stranding similar. No hydronephrosis.   2. A complex large ventral hernia with herniation of colon and small bowel loops, without evidence of bowel obstruction.         PHYSICAL EXAM  Vitals:    01/27/23 0910   Weight: 136.1 kg (300 lb)   Height: 1.702 m (5' 7\")     Wt Readings from Last 3 Encounters:   01/27/23 136.1 kg (300 lb)   04/08/22 145.2 kg (320 lb)   02/14/22 (!) 152.6 kg (336 lb 6.8 oz)           CC  Patient Care Team:  Linh Galindo MD as PCP - General (Internal Medicine)  Dusty Westbrook MD as MD (Surgery)  Javy Kang MD as MD (Urology)  Javy Kang MD as Assigned Surgical Provider  Yaquelin Jimenez, RN as Specialty Care Coordinator (Oncology)  Linh Galindo MD as Referring Physician (Internal Medicine)  FABIEN CASTRO    Copy to patient  DOMINIC HIGHTOWER  [address] [facility] [id number]    Again, thank you for allowing me to participate in the care of your patient.      Sincerely,    Javy Kang MD      "

## 2023-01-27 NOTE — NURSING NOTE
"Chief Complaint   Patient presents with     Consult For     Surgery        Height 1.702 m (5' 7\"), weight 136.1 kg (300 lb). Body mass index is 46.99 kg/m .    Patient Active Problem List   Diagnosis     Congenital obstruction of ureteropelvic junction     Health care directive on file     Anxiety     Intertrigo     Chronic abdominal wound infection     COVID-19 virus infection     Essential hypertension     History of alcohol abuse     History of small bowel obstruction     Hydronephrosis of left kidney     Incisional abscess, subsequent encounter     Morbid obesity (H)     MRSA (methicillin resistant Staphylococcus aureus)     Non-compliant patient     Ventral hernia without obstruction or gangrene     Pyelonephritis of left kidney     S/P hernia repair     Tobacco use disorder     Type 2 diabetes mellitus, with long-term current use of insulin (H)     Vitamin D deficiency       Allergies   Allergen Reactions     Adhesive Tape Other (See Comments)     Causes burns and blisters       Current Outpatient Medications   Medication Sig Dispense Refill     atorvastatin (LIPITOR) 40 MG tablet Take 40 mg by mouth       blood glucose (RELION PRIME TEST) test strip Test blood sugar before meals and at bedtime as directed       Blood Glucose Monitoring Suppl (RELION PRIME MONITOR) DARVIN 1 Device       ibuprofen (ADVIL/MOTRIN) 200 MG tablet Take 400 mg by mouth       insulin lispro prot & lispro (HUMALOG MIX 75/25 KWIKPEN) injection Inject Subcutaneous 3 times daily (before meals) (Patient not taking: Reported on 2/10/2022)       LANTUS SOLOSTAR 100 UNIT/ML soln 34 Units At Bedtime        metFORMIN (GLUCOPHAGE) 500 MG tablet Take 1,000 mg by mouth 2 times daily (with meals)       nystatin (MYCOSTATIN) cream Apply topically 2 times daily       order for DME Abdominal Binder - Large 2 each 3     OZEMPIC, 0.25 OR 0.5 MG/DOSE, 2 MG/1.5ML SOPN  (Patient not taking: Reported on 2/10/2022)       RELION ULTRA THIN LANCETS MISC        " RYBELSUS 7 MG TABS          Social History     Tobacco Use     Smoking status: Every Day     Types: Cigarettes     Smokeless tobacco: Never   Substance Use Topics     Alcohol use: Yes     Comment: once a month     Drug use: Not Currently       Brina Claire  1/27/2023  9:11 AM

## 2023-01-28 LAB — BACTERIA UR CULT: ABNORMAL

## 2023-02-10 ENCOUNTER — TELEPHONE (OUTPATIENT)
Dept: UROLOGY | Facility: CLINIC | Age: 48
End: 2023-02-10
Payer: COMMERCIAL

## 2023-03-07 ENCOUNTER — TELEPHONE (OUTPATIENT)
Dept: UROLOGY | Facility: CLINIC | Age: 48
End: 2023-03-07
Payer: COMMERCIAL

## 2023-03-07 NOTE — CONFIDENTIAL NOTE
Patient called asking about the progress of his surgery. Per Linda Lugo, , patient is awaiting a call from Dr. Blackman, General Surgery, for a consultation appointment.  The patient states that he has no heard from Dr. Blackman's office and is growing concerned.  This author reached out to Dr. Blackman's office for clarification.    Dave Freeman, RN  RN Care Coordinator - Urology

## 2023-03-09 ENCOUNTER — VIRTUAL VISIT (OUTPATIENT)
Dept: SURGERY | Facility: CLINIC | Age: 48
End: 2023-03-09
Payer: COMMERCIAL

## 2023-03-09 VITALS — HEIGHT: 67 IN | BODY MASS INDEX: 45.52 KG/M2 | WEIGHT: 290 LBS

## 2023-03-09 DIAGNOSIS — E66.01 MORBID OBESITY (H): ICD-10-CM

## 2023-03-09 DIAGNOSIS — Z87.19 S/P HERNIA REPAIR: Primary | ICD-10-CM

## 2023-03-09 DIAGNOSIS — Z98.890 S/P HERNIA REPAIR: Primary | ICD-10-CM

## 2023-03-09 PROCEDURE — 99202 OFFICE O/P NEW SF 15 MIN: CPT | Mod: 93 | Performed by: SURGERY

## 2023-03-09 ASSESSMENT — PAIN SCALES - GENERAL: PAINLEVEL: SEVERE PAIN (7)

## 2023-03-09 NOTE — PROGRESS NOTES
"Saleem Wilde is a 47 year old who is being evaluated via a billable video visit.      How would you like to obtain your AVS? MyChart  If the video visit is dropped, the invitation should be resent by: Text to cell phone: [phone]  Will anyone else be joining your video visit? No  If patient encounters technical issues they should call [phone]    During this virtual visit the patient is located in [place], patient verifies this as the location during the entirety of this visit.     Video-Visit Details  Saleem Wilde is a 47 year old male who is being evaluated via a billable telephone visit.      The patient has been notified of following:     \"This telephone visit will be conducted via a call between you and your physician/provider. We have found that certain health care needs can be provided without the need for a physical exam.  This service lets us provide the care you need with a short phone conversation.  If a prescription is necessary we can send it directly to your pharmacy.  If lab work is needed we can place an order for that and you can then stop by our lab to have the test done at a later time.    If during the course of the call the physician/provider feels a telephone visit is not appropriate, you will not be charged for this service.\"       Saleem Wilde complains of history of abdominal wall hernia.    He developed the hernia prior to 2009 and initially had surgery in 2009 by Dr. Fields.    Had subsequent bowel obstructions and then surgery again in 2015 by Dr. Adams.        Has had multiple complex repairs.    He has a complete blowout abdominal wall hernia of the lower abdomen.    This has been seen by multiple hernia surgeons, all appropriately deferring elective repair.    Chief Complaint   Patient presents with     RECHECK     Case consult       I have reviewed and updated the patient's Past Medical History, Social History, Family History and Medication List.    ALLERGIES  Adhesive " tape    Additional provider notes: Will be available on 3/27/2023; however, Dr. Kang has plans to do this via retroperitoneal approach.    I do think the upper abdomen would be free of adhesion IF access to the left upper quadrant was needed laparoscopically (greater sac access).        Lower abdomen CT scan:        Upper abdomen CT scan:            Assessment/Plan:  Has large complex lower abdominal wall hernia.  This cannot be repaired without extensive weight loss.    Will be having surgery by Dr. Kang; will be on standby if any bowel/hernia issues are involved.    Phone call duration: 20 minutes    Tomás Blackman MD  Surgery  [phone] (hospital )  [phone] (clinic nurses)          Op Note - Raffi Adams MD - 2015 3:24 PM CST  Formatting of this note might be different from the original.      Patient Name: DOMINIC HIGHTOWER  Date of Service: 2015 : 1975 Age: 40Y Sex: M  MRN: [id number] Site MRN:   Patient Loc/Room #: [facility]/S PACU  Provider: Raffi Adams MD, Trauma Surgery    OPERATIVE REPORT    SITE: [facility]    PREOPERATIVE DIAGNOSIS: Obstructing recurrent umbilical hernia.     POSTOPERATIVE DIAGNOSIS: Same.     OPERATIVE PROCEDURE: Exploratory laparotomy, reduction and lysis of adhesions of obstructed incarcerated hernia, incisional herniorrhaphy with Prolene mesh (retrorectus) and bilateral lateral releases.     Description of the surgery: Under general anesthesia, the patient was prepped and draped for the above listed procedure. A midline incision was made centered about the umbilicus and later extended cephalad to encompass a large multiloculated subcutaneous hernia. Dissection through the subcu almost immediately encountered this large complex hernia and a great deal of time was spent carefully dissecting the sac down to its fascial level where the sac was incised sharply and revealing a 8 cm radius fascial  defect. The bowel did not easily reduce from the hernia sac, which had to be carefully dissected and ultimately inverted to free the bowel from multiple compartments within this hernia sac as well as adhesions to the parietal surface. When complete, the bowel was returned to the abdominal cavity. The fascial edge was clearly delineated. Large subcutaneous flaps were created bilaterally with rakes and clearing of the subcutaneous tissue from the anterior rectus fascia until we were lateral to the rectus fascia. At this point, the anterior sheath was opened just lateral to the linea alba exposing the underlying rectus muscle on both sides. The anterior rectus was opened well beyond the margins of the hernial fascial defect. The lateral edge of the external oblique was so identified by mobilizing the rectus muscle posteriorly. The external oblique fascia lateral to this was then opened into its muscle layer and using a Shahid clamp the anterior rectus fascia opened widely well beyond the margins of the fascial hernial defect. This was done on both sides. A large white towel was placed within the abdomen. The fascial defect was closed transversely with running 0 PDS suture. The closure was injected with 0.25% bupivacaine with epinephrine. The field was copiously irrigated. A white towel was laid over the bowel prior to this and was removed before closure. Also obese for closure the posterior rectus fascia had 6 peripheral stay sutures placed carefully through the posterior fascia for later knitting the edges of the retrorectus Prolene mesh. This mesh had been soaking in Ancef and was brought on to the operative field. It measured 15 x 15 cm. With the 6 prior placed 0 PDS sutures it was knitted at appropriate and corresponding locations, tied first in the right lateral abdomen and then brought topically to the left lateral abdomen where the left-sided sutures were placed and tied creating a retrorectus Prolene sheet  well wide of the margins of the closed posterior rectus fascia. The suture sites were injected with bupivacaine with epinephrine and the field was copiously irrigated. The anterior rectus fascia was closed in a T-fashion with the T-bar cephalad by running 0 PDS suture. This entirely incorporated the underlying mesh that had its edges trimmed in a more circular fashion once placed. This fascial closure then was injected with bupivacaine with epinephrine. The field was irrigated. Two 19-Portuguese round fluted drains were exited through separate stab incisions in the left and right lower quadrants and placed in the space between the subcutaneous dissection and the anterior rectus fascia. These were sutured in place with 0 silk and the skin was closed with staples. Sterile dressing was applied. The drains were placed to bulb suction. The patient tolerated the procedure well and was brought to recovery room in good condition.     SPECIMEN: Hernia sac.     COMPLICATIONS: Without.     Implants: Trimmed 15 x 15 cm Prolene mesh in a retrorectus position.     DRAINS: Two 19-Portuguese round fluted drains.     ANESTHESIA: General.     BLOOD LOSS: 100 mL.     Replacement: None.     FLUIDS: Crystalloid.     SURGEON: Raffi Adams MD.     ASSISTANT: Srinivas.     Raffi Adams MD  [facility]  Trauma Surgery    cc:    D: 2015 15:03:28/ Job ID: [id number]  T: 2015 15:24:55/djn Document ID: [id number]          Op Note - Raffi Adams MD - 2015 5:59 AM CST  Formatting of this note might be different from the original.  [facility]    Patient Name: DOMINIC HIGHTOWER  Date of Service: 2015 : 1975 Age: 40Y Sex: M  MRN: [id number] Site MRN:   Patient Loc/Room #: [facility]/7257  Provider: Raffi Adams MD, Trauma Surgery    OPERATIVE REPORT    SITE: [facility]    PREOPERATIVE DIAGNOSIS: Postop bleeding from the abdominal flaps, probable   hemorrhage.     POSTOPERATIVE DIAGNOSIS: Postop bleeding from the abdominal flaps with no identified bleeding site (all clotted blood).     PROCEDURE: Wound exploration of the abdominal wall, evacuation of a large hematoma and thorough inspection for bleeding, and placement of fibrillar pads before reclosing the wound.     DESCRIPTION OF THE SURGERY: Under general anesthesia, the patient was prepared by Anesthesia for an acute hemorrhaging patient and the abdomen was prepped and draped for the above listed procedure. The old staples were removed and the incision opened. The subcutaneous large operative site contained a large amount of clot that was evacuated and measured at 1200 mL. The actual bleeding would have been in excess of the clotted blood and is estimated at 2500 mL. His vital signs responded to colloid replacement with 2 units of blood and 2 units of fresh frozen plasma, in addition to crystalloid. The large operative subcutaneous space was washed clear of clots and thoroughly inspected in sections, looking for a source of bleeding. No bleeding sites, not even an oozing site was identified. At one point, packs were placed in all quadrants and time allowed for reexploration and after 5 minutes, removal of packs did not demonstrate any point of bleeding. Again, the extensive subcutaneous and anterior musculofascial abdominal wall mobilized in his surgery the day prior was thoroughly inspected and no bleeding point was found. At this point, fibrillar pads were placed over raw surfaces in both sides of the abdomen. The prior placed 19-Setswana round fluted drains were repositioned. The skin was closed with staples. Sterile dressing was applied. Following this, the drain tubing external to the patient was cut shorter and the bulb syringes washed and reconnected. He was left in the operating room much improved and time allowed to have the neuromuscular blockade to wear off for extubation in the operating room  prior to transfer to the postanesthesia care unit with a surgical intensive care unit bed reserved for observation this evening.     SPECIMEN: Without.     COMPLICATIONS: Without.     DRAINS: 19-Czech fluted drain (2) that were retained from his prior surgery.     ANESTHESIA: General.     ESTIMATED BLOOD LOSS: 2500 mL (prior to the procedure).     REPLACEMENT: 2 units of packed cells.     FLUIDS: Were also blood, fresh frozen plasma, and crystalloid.     SURGEON: Raffi Adams MD.    ASSISTANT: Shelly Sanchez.     Raffi Adams MD  [facility]  Trauma Surgery    cc:    D: 12/17/2015 20:01:44/MATT Job ID: [id number]  T: 12/18/2015 05:59:20/romario Document ID: [id number]    Electronically signed by Raffi Adams MD at 12/18/2015 8:27 PM CST

## 2023-03-09 NOTE — LETTER
"3/9/2023       RE: Saleem Wilde  [address] [place] [id number]     Dear Colleague,    Thank you for referring your patient, Saleem Wilde, to the [facility] [place] at [facility]. Please see a copy of my visit note below.    Saleem Wilde is a 47 year old who is being evaluated via a billable video visit.      How would you like to obtain your AVS? MyChart  If the video visit is dropped, the invitation should be resent by: Text to cell phone: [phone]  Will anyone else be joining your video visit? No  If patient encounters technical issues they should call [phone]    During this virtual visit the patient is located in [place], patient verifies this as the location during the entirety of this visit.     Video-Visit Details  Saleem Wilde is a 47 year old male who is being evaluated via a billable telephone visit.      The patient has been notified of following:     \"This telephone visit will be conducted via a call between you and your physician/provider. We have found that certain health care needs can be provided without the need for a physical exam.  This service lets us provide the care you need with a short phone conversation.  If a prescription is necessary we can send it directly to your pharmacy.  If lab work is needed we can place an order for that and you can then stop by our lab to have the test done at a later time.    If during the course of the call the physician/provider feels a telephone visit is not appropriate, you will not be charged for this service.\"       Saleem Wilde complains of history of abdominal wall hernia.    He developed the hernia prior to 2009 and initially had surgery in 2009 by Dr. Fields.    Had subsequent bowel obstructions and then surgery again in 2015 by Dr. Adams.        Has had multiple complex repairs.    He has a complete blowout abdominal wall hernia of the lower " abdomen.    This has been seen by multiple hernia surgeons, all appropriately deferring elective repair.    Chief Complaint   Patient presents with     RECHECK     Case consult       I have reviewed and updated the patient's Past Medical History, Social History, Family History and Medication List.    ALLERGIES  Adhesive tape    Additional provider notes: Will be available on 3/27/2023; however, Dr. Kang has plans to do this via retroperitoneal approach.    I do think the upper abdomen would be free of adhesion IF access to the left upper quadrant was needed laparoscopically (greater sac access).        Lower abdomen CT scan:        Upper abdomen CT scan:            Assessment/Plan:  Has large complex lower abdominal wall hernia.  This cannot be repaired without extensive weight loss.    Will be having surgery by Dr. Kang; will be on standby if any bowel/hernia issues are involved.    Phone call duration: 20 minutes    Tomás Blackman MD  Surgery  [phone] (hospital )  [phone] (clinic nurses)          Op Note - Raffi Adams MD - 2015 3:24 PM CST  Formatting of this note might be different from the original.  [facility]    Patient Name: DOMINIC HIGHTOWER  Date of Service: 2015 : 1975 Age: 40Y Sex: M  MRN: [id number] Site MRN:   Patient Loc/Room #: [facility]/S PACU  Provider: Raffi Adams MD, Trauma Surgery    OPERATIVE REPORT    SITE: [facility]    PREOPERATIVE DIAGNOSIS: Obstructing recurrent umbilical hernia.     POSTOPERATIVE DIAGNOSIS: Same.     OPERATIVE PROCEDURE: Exploratory laparotomy, reduction and lysis of adhesions of obstructed incarcerated hernia, incisional herniorrhaphy with Prolene mesh (retrorectus) and bilateral lateral releases.     Description of the surgery: Under general anesthesia, the patient was prepped and draped for the above listed procedure. A midline incision was made centered about the umbilicus and later  extended cephalad to encompass a large multiloculated subcutaneous hernia. Dissection through the subcu almost immediately encountered this large complex hernia and a great deal of time was spent carefully dissecting the sac down to its fascial level where the sac was incised sharply and revealing a 8 cm radius fascial defect. The bowel did not easily reduce from the hernia sac, which had to be carefully dissected and ultimately inverted to free the bowel from multiple compartments within this hernia sac as well as adhesions to the parietal surface. When complete, the bowel was returned to the abdominal cavity. The fascial edge was clearly delineated. Large subcutaneous flaps were created bilaterally with rakes and clearing of the subcutaneous tissue from the anterior rectus fascia until we were lateral to the rectus fascia. At this point, the anterior sheath was opened just lateral to the linea alba exposing the underlying rectus muscle on both sides. The anterior rectus was opened well beyond the margins of the hernial fascial defect. The lateral edge of the external oblique was so identified by mobilizing the rectus muscle posteriorly. The external oblique fascia lateral to this was then opened into its muscle layer and using a Shahid clamp the anterior rectus fascia opened widely well beyond the margins of the fascial hernial defect. This was done on both sides. A large white towel was placed within the abdomen. The fascial defect was closed transversely with running 0 PDS suture. The closure was injected with 0.25% bupivacaine with epinephrine. The field was copiously irrigated. A white towel was laid over the bowel prior to this and was removed before closure. Also obese for closure the posterior rectus fascia had 6 peripheral stay sutures placed carefully through the posterior fascia for later knitting the edges of the retrorectus Prolene mesh. This mesh had been soaking in Ancef and was brought on to the  operative field. It measured 15 x 15 cm. With the 6 prior placed 0 PDS sutures it was knitted at appropriate and corresponding locations, tied first in the right lateral abdomen and then brought topically to the left lateral abdomen where the left-sided sutures were placed and tied creating a retrorectus Prolene sheet well wide of the margins of the closed posterior rectus fascia. The suture sites were injected with bupivacaine with epinephrine and the field was copiously irrigated. The anterior rectus fascia was closed in a T-fashion with the T-bar cephalad by running 0 PDS suture. This entirely incorporated the underlying mesh that had its edges trimmed in a more circular fashion once placed. This fascial closure then was injected with bupivacaine with epinephrine. The field was irrigated. Two 19-Azeri round fluted drains were exited through separate stab incisions in the left and right lower quadrants and placed in the space between the subcutaneous dissection and the anterior rectus fascia. These were sutured in place with 0 silk and the skin was closed with staples. Sterile dressing was applied. The drains were placed to bulb suction. The patient tolerated the procedure well and was brought to recovery room in good condition.     SPECIMEN: Hernia sac.     COMPLICATIONS: Without.     Implants: Trimmed 15 x 15 cm Prolene mesh in a retrorectus position.     DRAINS: Two 19-Azeri round fluted drains.     ANESTHESIA: General.     BLOOD LOSS: 100 mL.     Replacement: None.     FLUIDS: Crystalloid.     SURGEON: Raffi Adams MD.     ASSISTANT: Srinivas.     Raffi Adams MD  [facility]  Trauma Surgery    cc:    D: 12/16/2015 15:03:28/ Job ID: [id number]  T: 12/16/2015 15:24:55/djn Document ID: [id number]          Op Note - Raffi Adams MD - 12/18/2015 5:59 AM CST  Formatting of this note might be different from the original.  [facility]    Patient Name: DOMINIC HIGHTOWER  L  Date of Service: 2015 : 1975 Age: 40Y Sex: M  MRN: [id number] Site MRN:   Patient Loc/Room #: [facility]/7257  Provider: Raffi Adams MD, Trauma Surgery    OPERATIVE REPORT    SITE: [facility]    PREOPERATIVE DIAGNOSIS: Postop bleeding from the abdominal flaps, probable  hemorrhage.     POSTOPERATIVE DIAGNOSIS: Postop bleeding from the abdominal flaps with no identified bleeding site (all clotted blood).     PROCEDURE: Wound exploration of the abdominal wall, evacuation of a large hematoma and thorough inspection for bleeding, and placement of fibrillar pads before reclosing the wound.     DESCRIPTION OF THE SURGERY: Under general anesthesia, the patient was prepared by Anesthesia for an acute hemorrhaging patient and the abdomen was prepped and draped for the above listed procedure. The old staples were removed and the incision opened. The subcutaneous large operative site contained a large amount of clot that was evacuated and measured at 1200 mL. The actual bleeding would have been in excess of the clotted blood and is estimated at 2500 mL. His vital signs responded to colloid replacement with 2 units of blood and 2 units of fresh frozen plasma, in addition to crystalloid. The large operative subcutaneous space was washed clear of clots and thoroughly inspected in sections, looking for a source of bleeding. No bleeding sites, not even an oozing site was identified. At one point, packs were placed in all quadrants and time allowed for reexploration and after 5 minutes, removal of packs did not demonstrate any point of bleeding. Again, the extensive subcutaneous and anterior musculofascial abdominal wall mobilized in his surgery the day prior was thoroughly inspected and no bleeding point was found. At this point, fibrillar pads were placed over raw surfaces in both sides of the abdomen. The prior placed 19-Australian round fluted drains were repositioned. The skin  was closed with staples. Sterile dressing was applied. Following this, the drain tubing external to the patient was cut shorter and the bulb syringes washed and reconnected. He was left in the operating room much improved and time allowed to have the neuromuscular blockade to wear off for extubation in the operating room prior to transfer to the postanesthesia care unit with a surgical intensive care unit bed reserved for observation this evening.     SPECIMEN: Without.     COMPLICATIONS: Without.     DRAINS: 19-Bahraini fluted drain (2) that were retained from his prior surgery.     ANESTHESIA: General.     ESTIMATED BLOOD LOSS: 2500 mL (prior to the procedure).     REPLACEMENT: 2 units of packed cells.     FLUIDS: Were also blood, fresh frozen plasma, and crystalloid.     SURGEON: Raffi Adams MD.    ASSISTANT: Shelly Sanchez.     Raffi Adams MD  [facility]  Trauma Surgery    cc:    D: 12/17/2015 20:01:44/ Job ID: [id number]  T: 12/18/2015 05:59:20/kes Document ID: [id number]    Electronically signed by Raffi Adams MD at 12/18/2015 8:27 PM CST        Again, thank you for allowing me to participate in the care of your patient.      Sincerely,    Tomás Blackman MD

## 2023-03-09 NOTE — NURSING NOTE
"Chief Complaint   Patient presents with     RECHECK     Case consult       Vitals:    03/09/23 1108   Weight: 131.5 kg (290 lb)   Height: 1.702 m (5' 7\")       Body mass index is 45.42 kg/m .                          Abilio Centeno, EMT    "

## 2023-03-15 ENCOUNTER — TELEPHONE (OUTPATIENT)
Dept: UROLOGY | Facility: CLINIC | Age: 48
End: 2023-03-15
Payer: COMMERCIAL

## 2023-03-15 DIAGNOSIS — N13.5 UPJ OBSTRUCTION, ACQUIRED: Primary | ICD-10-CM

## 2023-03-15 NOTE — TELEPHONE ENCOUNTER
Patient is scheduled for surgery with Dr. Kang     Spoke with: Patient via phone      Date of Surgery: Monday April 24, 2023    Location: [facility]      Informed patient they will need an adult : Yes     Pre-op: Yes     PAC EVAL: Monday April 10, 2023    Additional imaging/appointments:     Post-op: Friday May 12, 2023     Additional comments:      Surgery packet: Sent via mail 3/15/23     Patient is aware that surgery time is tentative to change and to expect a call 3-1 business days from Pre Admission Nursing for instructions and arrival time

## 2023-03-16 NOTE — TELEPHONE ENCOUNTER
FUTURE VISIT INFORMATION      SURGERY INFORMATION:    Date: 23    Location: [facility]    Surgeon:  Javy Kang MD    Anesthesia Type:  general with block    Procedure: NEPHRECTOMY, ROBOT-ASSISTED, ULTRASOUND LEFT    RECORDS REQUESTED FROM:       Primary Care Provider: Sheri    Pertinent Medical History: hypertension    Most recent EKG+ Tracin22    Most recent ECHO: 21- Sheri

## 2023-03-17 DIAGNOSIS — N13.5 UPJ OBSTRUCTION, ACQUIRED: Primary | ICD-10-CM

## 2023-03-17 RX ORDER — FLUCONAZOLE 200 MG/1
400 TABLET ORAL DAILY
Qty: 14 TABLET | Refills: 0 | Status: SHIPPED | OUTPATIENT
Start: 2023-03-17 | End: 2023-03-24

## 2023-04-09 LAB
ABO/RH(D): NORMAL
ANTIBODY SCREEN: NEGATIVE
SPECIMEN EXPIRATION DATE: NORMAL

## 2023-04-10 ENCOUNTER — ANESTHESIA EVENT (OUTPATIENT)
Dept: SURGERY | Facility: CLINIC | Age: 48
DRG: 660 | End: 2023-04-10
Payer: COMMERCIAL

## 2023-04-10 ENCOUNTER — OFFICE VISIT (OUTPATIENT)
Dept: SURGERY | Facility: CLINIC | Age: 48
End: 2023-04-10
Payer: COMMERCIAL

## 2023-04-10 ENCOUNTER — LAB (OUTPATIENT)
Dept: LAB | Facility: CLINIC | Age: 48
End: 2023-04-10
Payer: COMMERCIAL

## 2023-04-10 ENCOUNTER — PRE VISIT (OUTPATIENT)
Dept: SURGERY | Facility: CLINIC | Age: 48
End: 2023-04-10

## 2023-04-10 VITALS
OXYGEN SATURATION: 98 % | RESPIRATION RATE: 16 BRPM | BODY MASS INDEX: 49.44 KG/M2 | HEIGHT: 67 IN | WEIGHT: 315 LBS | DIASTOLIC BLOOD PRESSURE: 82 MMHG | TEMPERATURE: 98.1 F | SYSTOLIC BLOOD PRESSURE: 142 MMHG | HEART RATE: 78 BPM

## 2023-04-10 DIAGNOSIS — Z79.4 TYPE 2 DIABETES MELLITUS WITH OTHER DIABETIC KIDNEY COMPLICATION, WITH LONG-TERM CURRENT USE OF INSULIN (H): Primary | ICD-10-CM

## 2023-04-10 DIAGNOSIS — Z01.818 PREOP EXAMINATION: ICD-10-CM

## 2023-04-10 DIAGNOSIS — E11.29 TYPE 2 DIABETES MELLITUS WITH OTHER DIABETIC KIDNEY COMPLICATION, WITH LONG-TERM CURRENT USE OF INSULIN (H): Primary | ICD-10-CM

## 2023-04-10 DIAGNOSIS — N28.9 NON-FUNCTIONING KIDNEY: ICD-10-CM

## 2023-04-10 DIAGNOSIS — Z79.4 TYPE 2 DIABETES MELLITUS WITH OTHER DIABETIC KIDNEY COMPLICATION, WITH LONG-TERM CURRENT USE OF INSULIN (H): ICD-10-CM

## 2023-04-10 DIAGNOSIS — E11.29 TYPE 2 DIABETES MELLITUS WITH OTHER DIABETIC KIDNEY COMPLICATION, WITH LONG-TERM CURRENT USE OF INSULIN (H): ICD-10-CM

## 2023-04-10 DIAGNOSIS — N13.5 UPJ OBSTRUCTION, ACQUIRED: ICD-10-CM

## 2023-04-10 LAB
ALBUMIN UR-MCNC: 20 MG/DL
ANION GAP SERPL CALCULATED.3IONS-SCNC: 8 MMOL/L (ref 7–15)
APPEARANCE UR: ABNORMAL
BACTERIA #/AREA URNS HPF: ABNORMAL /HPF
BILIRUB UR QL STRIP: NEGATIVE
BUN SERPL-MCNC: 19.7 MG/DL (ref 6–20)
CALCIUM SERPL-MCNC: 9.2 MG/DL (ref 8.6–10)
CHLORIDE SERPL-SCNC: 100 MMOL/L (ref 98–107)
COLOR UR AUTO: YELLOW
CREAT SERPL-MCNC: 0.7 MG/DL (ref 0.67–1.17)
DEPRECATED HCO3 PLAS-SCNC: 29 MMOL/L (ref 22–29)
ERYTHROCYTE [DISTWIDTH] IN BLOOD BY AUTOMATED COUNT: 15.3 % (ref 10–15)
GFR SERPL CREATININE-BSD FRML MDRD: >90 ML/MIN/1.73M2
GLUCOSE SERPL-MCNC: 269 MG/DL (ref 70–99)
GLUCOSE UR STRIP-MCNC: >=1000 MG/DL
HBA1C MFR BLD: 8.9 %
HCT VFR BLD AUTO: 41.6 % (ref 40–53)
HGB BLD-MCNC: 13.2 G/DL (ref 13.3–17.7)
HGB UR QL STRIP: ABNORMAL
KETONES UR STRIP-MCNC: NEGATIVE MG/DL
LEUKOCYTE ESTERASE UR QL STRIP: ABNORMAL
MCH RBC QN AUTO: 26.6 PG (ref 26.5–33)
MCHC RBC AUTO-ENTMCNC: 31.7 G/DL (ref 31.5–36.5)
MCV RBC AUTO: 84 FL (ref 78–100)
MUCOUS THREADS #/AREA URNS LPF: PRESENT /LPF
NITRATE UR QL: NEGATIVE
PH UR STRIP: 5.5 [PH] (ref 5–7)
PLATELET # BLD AUTO: 185 10E3/UL (ref 150–450)
POTASSIUM SERPL-SCNC: 4.5 MMOL/L (ref 3.4–5.3)
RBC # BLD AUTO: 4.97 10E6/UL (ref 4.4–5.9)
RBC URINE: 65 /HPF
SODIUM SERPL-SCNC: 137 MMOL/L (ref 136–145)
SP GR UR STRIP: 1.02 (ref 1–1.03)
SQUAMOUS EPITHELIAL: 1 /HPF
UROBILINOGEN UR STRIP-MCNC: NORMAL MG/DL
WBC # BLD AUTO: 6.9 10E3/UL (ref 4–11)
WBC URINE: >182 /HPF
YEAST #/AREA URNS HPF: ABNORMAL /HPF

## 2023-04-10 PROCEDURE — 87106 FUNGI IDENTIFICATION YEAST: CPT | Mod: 90 | Performed by: PATHOLOGY

## 2023-04-10 PROCEDURE — 86850 RBC ANTIBODY SCREEN: CPT | Mod: 90 | Performed by: PATHOLOGY

## 2023-04-10 PROCEDURE — 86901 BLOOD TYPING SEROLOGIC RH(D): CPT | Mod: 90 | Performed by: PATHOLOGY

## 2023-04-10 PROCEDURE — 99204 OFFICE O/P NEW MOD 45 MIN: CPT | Performed by: PHYSICIAN ASSISTANT

## 2023-04-10 PROCEDURE — 85027 COMPLETE CBC AUTOMATED: CPT | Performed by: PATHOLOGY

## 2023-04-10 PROCEDURE — 81001 URINALYSIS AUTO W/SCOPE: CPT | Performed by: PATHOLOGY

## 2023-04-10 PROCEDURE — 87086 URINE CULTURE/COLONY COUNT: CPT | Mod: 90 | Performed by: PATHOLOGY

## 2023-04-10 PROCEDURE — 83036 HEMOGLOBIN GLYCOSYLATED A1C: CPT | Mod: 90 | Performed by: PATHOLOGY

## 2023-04-10 PROCEDURE — 99000 SPECIMEN HANDLING OFFICE-LAB: CPT | Performed by: PATHOLOGY

## 2023-04-10 PROCEDURE — 86900 BLOOD TYPING SEROLOGIC ABO: CPT | Mod: 90 | Performed by: PATHOLOGY

## 2023-04-10 PROCEDURE — 80048 BASIC METABOLIC PNL TOTAL CA: CPT | Performed by: PATHOLOGY

## 2023-04-10 PROCEDURE — 36415 COLL VENOUS BLD VENIPUNCTURE: CPT | Performed by: PATHOLOGY

## 2023-04-10 ASSESSMENT — PAIN SCALES - GENERAL: PAINLEVEL: SEVERE PAIN (6)

## 2023-04-10 ASSESSMENT — ENCOUNTER SYMPTOMS: SEIZURES: 0

## 2023-04-10 ASSESSMENT — LIFESTYLE VARIABLES: TOBACCO_USE: 1

## 2023-04-10 NOTE — PATIENT INSTRUCTIONS
Preparing for Your Surgery      Name:  Saleem Wilde   MRN:  [id number]   :  1975   Today's Date:  4/10/2023       Arriving for surgery:  Surgery date:  23  Arrival time:  5.30AM     Surgeries and procedures: Adult patients can have 2 visitors all through the surgery process.     Visiting hours: 8 a.m. to 8:30 p.m.     Hospital: Adult patients and children under age 18 can have 4 visitor at a time     No visitors under the age of 5 are allowed for hospital patients.  Double occupancy rooms: Patients can have only two visitors at a time.     Patients with disabilities: Can have a support person with them (family member, service provider     Or someone well informed about their needs) plus the allowed number of visitors     Patients confirmed or suspected to have symptoms of COVID 19 or flu:     No visitors allowed for adult patients.   Children (under age 18) can have 1 named visitor.     People who are sick or showing symptoms of COVID 19 or flu:    Are not allowed to visit patients--we can only make exceptions in special situations.       Please follow these guidelines for your visit:   Arrive wearing a mask over your mouth and nose; we will give you a medical mask to wear    If you arrive wearing a cloth mask.   Keep it on during your entire visit, even when in patient's room.   If you don't wear a mask we'll ask you to leave.     Clean your hands with alcohol hand . Do this when you arrive at and leave the building and patient room,    And again after you touch your mask or anything in the room.     You can t visit if you have a fever, cough, shortness of breath, muscle aches, headaches, sore throat    Or diarrhea      Stay 6 feet away from others during your visit and between visits     Go directly to and from the room you are visiting.     Stay in the patient s room during your visit. Limit going to other places in the hospital as much as possible     Leave bags and jackets at home or  in the car.     For everyone s health, please don t come and go during your visit. That includes for smoking   during your visit.     Please come to:     [facility] [address]  [address] [place]  [address]    -   Parking is available in the Patient Visitor Ramp on [facility].     -   When entering the hospital you will be asked COVID screening questions, you will then be directed to Registration.  Registration will direct you to the 3rd floor Surgery waiting room.     -   Please ask if you need an escort or a wheelchair to the Surgery Waiting Room.  Preop number- [phone] ?     What can I eat or drink?  -  You may eat and drink normally up to 8 hours prior to arrival time. (Until 9.30PM)  -  You may have clear liquids until 2 hours prior to arrival time. (Until 3.30AM)    Examples of clear liquids:  Water  Clear broth  Juices (apple, white grape, white cranberry  and cider) without pulp  Noncarbonated, powder based beverages  (lemonade and Alexander-Aid)  Sodas (Sprite, 7-Up, ginger ale and seltzer)  Coffee or tea (without milk or cream)  Gatorade    -  No Alcohol for at least 24 hours before surgery.     Which medicines can I take?    Hold Aspirin for 7 days before surgery.   Hold Multivitamins for 7 days before surgery.  Hold Supplements for 7 days before surgery.  Hold Ibuprofen (Advil, Motrin) for 1 day(s) before surgery--unless otherwise directed by surgeon.  Hold Naproxen (Aleve) for 4 days before surgery.    -  DO NOT take these medications the day of surgery:  Metformin, Nystatin cream, Insulin Glargine (Lantus)    -  PLEASE TAKE these medications the day of surgery:  Atorvastatin (Lipitor), Bactrim    How do I prepare myself?  - Please take 2 showers (one the night prior to surgery and one the morning of surgery) using Scrubcare or Hibiclens soap.    Use this soap only from the neck to your toes.     Leave the soap on your  skin for one minute--then rinse thoroughly.      You may use your own shampoo and conditioner. No other hair products.   - Please remove all jewelry and body piercings.  - No lotions, deodorants or fragrance.  - No makeup or fingernail polish.   - Bring your ID and insurance card.    -If you have a Deep Brain Stimulator, Spinal Cord Stimulator, or any Neuro Stimulator device---you must bring the remote control to the hospital.      ALL PATIENTS GOING HOME THE SAME DAY OF SURGERY ARE REQUIRED TO HAVE A RESPONSIBLE ADULT TO DRIVE AND BE IN ATTENDANCE WITH THEM FOR 24 HOURS FOLLOWING SURGERY.    Covid testing policy as of 12/06/2022  Your surgeon will notify and schedule you for a COVID test if one is needed before surgery--please direct any questions or COVID symptoms to your surgeon      Questions or Concerns:    - For any questions regarding the day of surgery or your hospital stay, please contact the Pre Admission Nursing Office at [phone].       - If you have health changes between today and your surgery, please call your surgeon.       - For questions after surgery, please call your surgeons office.            OPTIMAL RECOVERY AFTER SURGERY        Begin hydrating yourself by drinking at least 8-10 glasses of clear liquids for 24 hours before surgery:      Suggested clear liquids:   Water    Clear Juices   Clear Broth   Non- carbonated beverages    (Crystal Light or Alexander Aid)   Sodas    (Sprite, 7 up, ginger ale, seltzer)   Gatorade              Drink clear liquids up until 4 hours before your surgery.       We would like you to purchase a drink such as Gatorade or Ensure Clear (not the milkshake type).  Drink this before bedtime and the morning of surgery drink between 8-10 ounces or until you feel hydrated.        Keeping well hydrated leads to your veins being plump, you wake up faster, and you are less likely to be nauseated. Start drinking water as soon as you can after surgery and advance to clear  liquids and food as tolerated.  IV fluids contain salt, drinking fluids will minimize the amount of IV fluids you need and decrease the amount of salt you get.                 The most common reason for the patient to be readmitted is dehydration. Staying hydrated after you go home from the hospital is very important.  Ensure or Ensure Clear are good options to keep you hydrated.

## 2023-04-12 LAB — BACTERIA UR CULT: ABNORMAL

## 2023-04-23 ASSESSMENT — LIFESTYLE VARIABLES: TOBACCO_USE: 1

## 2023-04-23 ASSESSMENT — ENCOUNTER SYMPTOMS: SEIZURES: 0

## 2023-04-24 ENCOUNTER — HOSPITAL ENCOUNTER (INPATIENT)
Facility: CLINIC | Age: 48
LOS: 1 days | Discharge: HOME | DRG: 660 | End: 2023-04-25
Attending: UROLOGY | Admitting: UROLOGY
Payer: COMMERCIAL

## 2023-04-24 ENCOUNTER — ANESTHESIA (OUTPATIENT)
Dept: SURGERY | Facility: CLINIC | Age: 48
DRG: 660 | End: 2023-04-24
Payer: COMMERCIAL

## 2023-04-24 DIAGNOSIS — G89.18 POSTOPERATIVE PAIN: Primary | ICD-10-CM

## 2023-04-24 PROBLEM — Z98.890 POST-OPERATIVE STATE: Status: ACTIVE | Noted: 2023-04-24

## 2023-04-24 LAB
ABO/RH(D): NORMAL
ANTIBODY SCREEN: NEGATIVE
BLD PROD TYP BPU: NORMAL
BLD PROD TYP BPU: NORMAL
BLOOD COMPONENT TYPE: NORMAL
BLOOD COMPONENT TYPE: NORMAL
CODING SYSTEM: NORMAL
CODING SYSTEM: NORMAL
CROSSMATCH: NORMAL
CROSSMATCH: NORMAL
ERYTHROCYTE [DISTWIDTH] IN BLOOD BY AUTOMATED COUNT: 15.2 % (ref 10–15)
GLUCOSE BLDC GLUCOMTR-MCNC: 115 MG/DL (ref 70–99)
GLUCOSE BLDC GLUCOMTR-MCNC: 199 MG/DL (ref 70–99)
GLUCOSE BLDC GLUCOMTR-MCNC: 199 MG/DL (ref 70–99)
GLUCOSE BLDC GLUCOMTR-MCNC: 206 MG/DL (ref 70–99)
GLUCOSE BLDC GLUCOMTR-MCNC: 212 MG/DL (ref 70–99)
GLUCOSE BLDC GLUCOMTR-MCNC: 226 MG/DL (ref 70–99)
GLUCOSE BLDC GLUCOMTR-MCNC: 227 MG/DL (ref 70–99)
GLUCOSE BLDC GLUCOMTR-MCNC: 258 MG/DL (ref 70–99)
HCT VFR BLD AUTO: 39.6 % (ref 40–53)
HGB BLD-MCNC: 12.5 G/DL (ref 13.3–17.7)
HGB BLD-MCNC: 13.9 G/DL (ref 13.3–17.7)
MCH RBC QN AUTO: 26.7 PG (ref 26.5–33)
MCHC RBC AUTO-ENTMCNC: 31.6 G/DL (ref 31.5–36.5)
MCV RBC AUTO: 84 FL (ref 78–100)
PLATELET # BLD AUTO: 225 10E3/UL (ref 150–450)
RBC # BLD AUTO: 4.69 10E6/UL (ref 4.4–5.9)
SPECIMEN EXPIRATION DATE: NORMAL
UNIT ABO/RH: NORMAL
UNIT ABO/RH: NORMAL
UNIT NUMBER: NORMAL
UNIT NUMBER: NORMAL
UNIT STATUS: NORMAL
UNIT STATUS: NORMAL
UNIT TYPE ISBT: 5100
UNIT TYPE ISBT: 5100
WBC # BLD AUTO: 7.8 10E3/UL (ref 4–11)

## 2023-04-24 PROCEDURE — 710N000010 HC RECOVERY PHASE 1, LEVEL 2, PER MIN: Performed by: UROLOGY

## 2023-04-24 PROCEDURE — 86923 COMPATIBILITY TEST ELECTRIC: CPT

## 2023-04-24 PROCEDURE — 250N000011 HC RX IP 250 OP 636: Performed by: UROLOGY

## 2023-04-24 PROCEDURE — 85018 HEMOGLOBIN: CPT | Performed by: STUDENT IN AN ORGANIZED HEALTH CARE EDUCATION/TRAINING PROGRAM

## 2023-04-24 PROCEDURE — 88331 PATH CONSLTJ SURG 1 BLK 1SPC: CPT | Mod: TC | Performed by: UROLOGY

## 2023-04-24 PROCEDURE — 36415 COLL VENOUS BLD VENIPUNCTURE: CPT | Performed by: STUDENT IN AN ORGANIZED HEALTH CARE EDUCATION/TRAINING PROGRAM

## 2023-04-24 PROCEDURE — 258N000003 HC RX IP 258 OP 636: Performed by: STUDENT IN AN ORGANIZED HEALTH CARE EDUCATION/TRAINING PROGRAM

## 2023-04-24 PROCEDURE — 88305 TISSUE EXAM BY PATHOLOGIST: CPT | Mod: 26 | Performed by: PATHOLOGY

## 2023-04-24 PROCEDURE — 8E0W4CZ ROBOTIC ASSISTED PROCEDURE OF TRUNK REGION, PERCUTANEOUS ENDOSCOPIC APPROACH: ICD-10-PCS | Performed by: UROLOGY

## 2023-04-24 PROCEDURE — 88331 PATH CONSLTJ SURG 1 BLK 1SPC: CPT | Mod: 26 | Performed by: PATHOLOGY

## 2023-04-24 PROCEDURE — 0GB24ZX EXCISION OF LEFT ADRENAL GLAND, PERCUTANEOUS ENDOSCOPIC APPROACH, DIAGNOSTIC: ICD-10-PCS | Performed by: UROLOGY

## 2023-04-24 PROCEDURE — 36415 COLL VENOUS BLD VENIPUNCTURE: CPT | Performed by: REGISTERED NURSE

## 2023-04-24 PROCEDURE — 370N000017 HC ANESTHESIA TECHNICAL FEE, PER MIN: Performed by: UROLOGY

## 2023-04-24 PROCEDURE — 0TP9X0Z REMOVAL OF DRAINAGE DEVICE FROM URETER, EXTERNAL APPROACH: ICD-10-PCS | Performed by: UROLOGY

## 2023-04-24 PROCEDURE — 250N000011 HC RX IP 250 OP 636: Performed by: REGISTERED NURSE

## 2023-04-24 PROCEDURE — 250N000009 HC RX 250: Performed by: REGISTERED NURSE

## 2023-04-24 PROCEDURE — 250N000013 HC RX MED GY IP 250 OP 250 PS 637: Performed by: STUDENT IN AN ORGANIZED HEALTH CARE EDUCATION/TRAINING PROGRAM

## 2023-04-24 PROCEDURE — 250N000011 HC RX IP 250 OP 636: Performed by: STUDENT IN AN ORGANIZED HEALTH CARE EDUCATION/TRAINING PROGRAM

## 2023-04-24 PROCEDURE — 88307 TISSUE EXAM BY PATHOLOGIST: CPT | Mod: 26 | Performed by: PATHOLOGY

## 2023-04-24 PROCEDURE — 86850 RBC ANTIBODY SCREEN: CPT | Performed by: REGISTERED NURSE

## 2023-04-24 PROCEDURE — 360N000080 HC SURGERY LEVEL 7, PER MIN: Performed by: UROLOGY

## 2023-04-24 PROCEDURE — 999N000141 HC STATISTIC PRE-PROCEDURE NURSING ASSESSMENT: Performed by: UROLOGY

## 2023-04-24 PROCEDURE — 250N000025 HC SEVOFLURANE, PER MIN: Performed by: UROLOGY

## 2023-04-24 PROCEDURE — 50546 LAPAROSCOPIC NEPHRECTOMY: CPT | Mod: LT | Performed by: UROLOGY

## 2023-04-24 PROCEDURE — 272N000001 HC OR GENERAL SUPPLY STERILE: Performed by: UROLOGY

## 2023-04-24 PROCEDURE — 88305 TISSUE EXAM BY PATHOLOGIST: CPT | Mod: TC | Performed by: UROLOGY

## 2023-04-24 PROCEDURE — 85027 COMPLETE CBC AUTOMATED: CPT | Performed by: REGISTERED NURSE

## 2023-04-24 PROCEDURE — 250N000012 HC RX MED GY IP 250 OP 636 PS 637: Performed by: STUDENT IN AN ORGANIZED HEALTH CARE EDUCATION/TRAINING PROGRAM

## 2023-04-24 PROCEDURE — 0TT14ZZ RESECTION OF LEFT KIDNEY, PERCUTANEOUS ENDOSCOPIC APPROACH: ICD-10-PCS | Performed by: UROLOGY

## 2023-04-24 PROCEDURE — 120N000002 HC R&B MED SURG/OB UMMC

## 2023-04-24 PROCEDURE — 258N000003 HC RX IP 258 OP 636: Performed by: REGISTERED NURSE

## 2023-04-24 RX ORDER — GLYCOPYRROLATE 0.2 MG/ML
INJECTION, SOLUTION INTRAMUSCULAR; INTRAVENOUS PRN
Status: DISCONTINUED | OUTPATIENT
Start: 2023-04-24 | End: 2023-04-24

## 2023-04-24 RX ORDER — FENTANYL CITRATE 50 UG/ML
25 INJECTION, SOLUTION INTRAMUSCULAR; INTRAVENOUS EVERY 5 MIN PRN
Status: DISCONTINUED | OUTPATIENT
Start: 2023-04-24 | End: 2023-04-24 | Stop reason: HOSPADM

## 2023-04-24 RX ORDER — LIDOCAINE HYDROCHLORIDE 20 MG/ML
INJECTION, SOLUTION INFILTRATION; PERINEURAL PRN
Status: DISCONTINUED | OUTPATIENT
Start: 2023-04-24 | End: 2023-04-24

## 2023-04-24 RX ORDER — NALOXONE HYDROCHLORIDE 0.4 MG/ML
0.2 INJECTION, SOLUTION INTRAMUSCULAR; INTRAVENOUS; SUBCUTANEOUS
Status: DISCONTINUED | OUTPATIENT
Start: 2023-04-24 | End: 2023-04-25 | Stop reason: HOSPADM

## 2023-04-24 RX ORDER — SODIUM CHLORIDE, SODIUM LACTATE, POTASSIUM CHLORIDE, CALCIUM CHLORIDE 600; 310; 30; 20 MG/100ML; MG/100ML; MG/100ML; MG/100ML
INJECTION, SOLUTION INTRAVENOUS CONTINUOUS PRN
Status: DISCONTINUED | OUTPATIENT
Start: 2023-04-24 | End: 2023-04-24

## 2023-04-24 RX ORDER — NITROGLYCERIN 10 MG/100ML
INJECTION INTRAVENOUS PRN
Status: DISCONTINUED | OUTPATIENT
Start: 2023-04-24 | End: 2023-04-24

## 2023-04-24 RX ORDER — SODIUM CHLORIDE 9 MG/ML
INJECTION, SOLUTION INTRAVENOUS CONTINUOUS
Status: DISCONTINUED | OUTPATIENT
Start: 2023-04-24 | End: 2023-04-25

## 2023-04-24 RX ORDER — FENTANYL CITRATE 50 UG/ML
INJECTION, SOLUTION INTRAMUSCULAR; INTRAVENOUS PRN
Status: DISCONTINUED | OUTPATIENT
Start: 2023-04-24 | End: 2023-04-24

## 2023-04-24 RX ORDER — ATORVASTATIN CALCIUM 40 MG/1
1 TABLET, FILM COATED ORAL DAILY
COMMUNITY
Start: 2023-04-17

## 2023-04-24 RX ORDER — NALOXONE HYDROCHLORIDE 0.4 MG/ML
0.4 INJECTION, SOLUTION INTRAMUSCULAR; INTRAVENOUS; SUBCUTANEOUS
Status: DISCONTINUED | OUTPATIENT
Start: 2023-04-24 | End: 2023-04-25 | Stop reason: HOSPADM

## 2023-04-24 RX ORDER — ONDANSETRON 4 MG/1
4 TABLET, ORALLY DISINTEGRATING ORAL EVERY 6 HOURS PRN
Status: DISCONTINUED | OUTPATIENT
Start: 2023-04-24 | End: 2023-04-25 | Stop reason: HOSPADM

## 2023-04-24 RX ORDER — PROPOFOL 10 MG/ML
INJECTION, EMULSION INTRAVENOUS PRN
Status: DISCONTINUED | OUTPATIENT
Start: 2023-04-24 | End: 2023-04-24

## 2023-04-24 RX ORDER — CEFAZOLIN SODIUM/WATER 3 G/30 ML
3 SYRINGE (ML) INTRAVENOUS
Status: COMPLETED | OUTPATIENT
Start: 2023-04-24 | End: 2023-04-24

## 2023-04-24 RX ORDER — NICOTINE POLACRILEX 4 MG
15-30 LOZENGE BUCCAL
Status: DISCONTINUED | OUTPATIENT
Start: 2023-04-24 | End: 2023-04-25 | Stop reason: HOSPADM

## 2023-04-24 RX ORDER — LIDOCAINE 40 MG/G
CREAM TOPICAL
Status: DISCONTINUED | OUTPATIENT
Start: 2023-04-24 | End: 2023-04-25 | Stop reason: HOSPADM

## 2023-04-24 RX ORDER — ACETAMINOPHEN 325 MG/1
975 TABLET ORAL EVERY 8 HOURS
Status: DISCONTINUED | OUTPATIENT
Start: 2023-04-24 | End: 2023-04-25 | Stop reason: HOSPADM

## 2023-04-24 RX ORDER — BUPIVACAINE HYDROCHLORIDE 2.5 MG/ML
INJECTION, SOLUTION INFILTRATION; PERINEURAL PRN
Status: DISCONTINUED | OUTPATIENT
Start: 2023-04-24 | End: 2023-04-25

## 2023-04-24 RX ORDER — HEPARIN SODIUM 5000 [USP'U]/.5ML
5000 INJECTION, SOLUTION INTRAVENOUS; SUBCUTANEOUS
Status: COMPLETED | OUTPATIENT
Start: 2023-04-24 | End: 2023-04-24

## 2023-04-24 RX ORDER — SODIUM CHLORIDE, SODIUM LACTATE, POTASSIUM CHLORIDE, CALCIUM CHLORIDE 600; 310; 30; 20 MG/100ML; MG/100ML; MG/100ML; MG/100ML
INJECTION, SOLUTION INTRAVENOUS CONTINUOUS
Status: DISCONTINUED | OUTPATIENT
Start: 2023-04-24 | End: 2023-04-24 | Stop reason: HOSPADM

## 2023-04-24 RX ORDER — ONDANSETRON 2 MG/ML
INJECTION INTRAMUSCULAR; INTRAVENOUS PRN
Status: DISCONTINUED | OUTPATIENT
Start: 2023-04-24 | End: 2023-04-24

## 2023-04-24 RX ORDER — ONDANSETRON 4 MG/1
4 TABLET, ORALLY DISINTEGRATING ORAL EVERY 30 MIN PRN
Status: DISCONTINUED | OUTPATIENT
Start: 2023-04-24 | End: 2023-04-24 | Stop reason: HOSPADM

## 2023-04-24 RX ORDER — AMOXICILLIN 250 MG
1 CAPSULE ORAL 2 TIMES DAILY
Status: DISCONTINUED | OUTPATIENT
Start: 2023-04-24 | End: 2023-04-25 | Stop reason: HOSPADM

## 2023-04-24 RX ORDER — ONDANSETRON 2 MG/ML
4 INJECTION INTRAMUSCULAR; INTRAVENOUS EVERY 30 MIN PRN
Status: DISCONTINUED | OUTPATIENT
Start: 2023-04-24 | End: 2023-04-24 | Stop reason: HOSPADM

## 2023-04-24 RX ORDER — ONDANSETRON 2 MG/ML
4 INJECTION INTRAMUSCULAR; INTRAVENOUS EVERY 6 HOURS PRN
Status: DISCONTINUED | OUTPATIENT
Start: 2023-04-24 | End: 2023-04-25 | Stop reason: HOSPADM

## 2023-04-24 RX ORDER — FENTANYL CITRATE 50 UG/ML
50 INJECTION, SOLUTION INTRAMUSCULAR; INTRAVENOUS EVERY 5 MIN PRN
Status: DISCONTINUED | OUTPATIENT
Start: 2023-04-24 | End: 2023-04-24 | Stop reason: HOSPADM

## 2023-04-24 RX ORDER — DEXAMETHASONE SODIUM PHOSPHATE 4 MG/ML
INJECTION, SOLUTION INTRA-ARTICULAR; INTRALESIONAL; INTRAMUSCULAR; INTRAVENOUS; SOFT TISSUE PRN
Status: DISCONTINUED | OUTPATIENT
Start: 2023-04-24 | End: 2023-04-24

## 2023-04-24 RX ORDER — HYDROMORPHONE HCL IN WATER/PF 6 MG/30 ML
0.2 PATIENT CONTROLLED ANALGESIA SYRINGE INTRAVENOUS
Status: DISCONTINUED | OUTPATIENT
Start: 2023-04-24 | End: 2023-04-25 | Stop reason: HOSPADM

## 2023-04-24 RX ORDER — HYDROMORPHONE HCL IN WATER/PF 6 MG/30 ML
0.2 PATIENT CONTROLLED ANALGESIA SYRINGE INTRAVENOUS EVERY 5 MIN PRN
Status: DISCONTINUED | OUTPATIENT
Start: 2023-04-24 | End: 2023-04-24 | Stop reason: HOSPADM

## 2023-04-24 RX ORDER — OXYCODONE HYDROCHLORIDE 5 MG/1
5 TABLET ORAL EVERY 4 HOURS PRN
Status: DISCONTINUED | OUTPATIENT
Start: 2023-04-24 | End: 2023-04-25 | Stop reason: HOSPADM

## 2023-04-24 RX ORDER — CEFAZOLIN SODIUM/WATER 3 G/30 ML
3 SYRINGE (ML) INTRAVENOUS SEE ADMIN INSTRUCTIONS
Status: DISCONTINUED | OUTPATIENT
Start: 2023-04-24 | End: 2023-04-24 | Stop reason: HOSPADM

## 2023-04-24 RX ORDER — POLYETHYLENE GLYCOL 3350 17 G/17G
17 POWDER, FOR SOLUTION ORAL DAILY
Status: DISCONTINUED | OUTPATIENT
Start: 2023-04-25 | End: 2023-04-25 | Stop reason: HOSPADM

## 2023-04-24 RX ORDER — HYDROMORPHONE HCL IN WATER/PF 6 MG/30 ML
0.4 PATIENT CONTROLLED ANALGESIA SYRINGE INTRAVENOUS EVERY 5 MIN PRN
Status: DISCONTINUED | OUTPATIENT
Start: 2023-04-24 | End: 2023-04-24 | Stop reason: HOSPADM

## 2023-04-24 RX ORDER — DEXTROSE MONOHYDRATE 25 G/50ML
25-50 INJECTION, SOLUTION INTRAVENOUS
Status: DISCONTINUED | OUTPATIENT
Start: 2023-04-24 | End: 2023-04-25 | Stop reason: HOSPADM

## 2023-04-24 RX ADMIN — Medication 10 MG: at 12:08

## 2023-04-24 RX ADMIN — SODIUM CHLORIDE, POTASSIUM CHLORIDE, SODIUM LACTATE AND CALCIUM CHLORIDE: 600; 310; 30; 20 INJECTION, SOLUTION INTRAVENOUS at 12:34

## 2023-04-24 RX ADMIN — FENTANYL CITRATE 50 MCG: 50 INJECTION, SOLUTION INTRAMUSCULAR; INTRAVENOUS at 09:29

## 2023-04-24 RX ADMIN — HYDROMORPHONE HYDROCHLORIDE 0.2 MG: 0.2 INJECTION, SOLUTION INTRAMUSCULAR; INTRAVENOUS; SUBCUTANEOUS at 16:26

## 2023-04-24 RX ADMIN — HYDROMORPHONE HYDROCHLORIDE 0.2 MG: 0.2 INJECTION, SOLUTION INTRAMUSCULAR; INTRAVENOUS; SUBCUTANEOUS at 15:17

## 2023-04-24 RX ADMIN — HYDROMORPHONE HYDROCHLORIDE 0.5 MG: 1 INJECTION, SOLUTION INTRAMUSCULAR; INTRAVENOUS; SUBCUTANEOUS at 13:04

## 2023-04-24 RX ADMIN — MIDAZOLAM 2 MG: 1 INJECTION INTRAMUSCULAR; INTRAVENOUS at 08:18

## 2023-04-24 RX ADMIN — GLYCOPYRROLATE 0.2 MG: 0.2 INJECTION, SOLUTION INTRAMUSCULAR; INTRAVENOUS at 08:54

## 2023-04-24 RX ADMIN — Medication 3 G: at 12:30

## 2023-04-24 RX ADMIN — HUMAN INSULIN 5 UNITS/HR: 100 INJECTION, SOLUTION SUBCUTANEOUS at 11:25

## 2023-04-24 RX ADMIN — OXYCODONE HYDROCHLORIDE 5 MG: 5 TABLET ORAL at 22:13

## 2023-04-24 RX ADMIN — SODIUM CHLORIDE, POTASSIUM CHLORIDE, SODIUM LACTATE AND CALCIUM CHLORIDE: 600; 310; 30; 20 INJECTION, SOLUTION INTRAVENOUS at 07:46

## 2023-04-24 RX ADMIN — Medication 10 MG: at 10:22

## 2023-04-24 RX ADMIN — Medication 10 MG: at 11:10

## 2023-04-24 RX ADMIN — Medication 3 G: at 08:15

## 2023-04-24 RX ADMIN — PHENYLEPHRINE HYDROCHLORIDE 0.5 MCG/KG/MIN: 10 INJECTION INTRAVENOUS at 08:09

## 2023-04-24 RX ADMIN — NITROGLYCERIN 150 MCG: 10 INJECTION INTRAVENOUS at 09:47

## 2023-04-24 RX ADMIN — SENNOSIDES AND DOCUSATE SODIUM 1 TABLET: 50; 8.6 TABLET ORAL at 20:16

## 2023-04-24 RX ADMIN — NITROGLYCERIN 100 MCG: 10 INJECTION INTRAVENOUS at 09:41

## 2023-04-24 RX ADMIN — SUGAMMADEX 300 MG: 100 INJECTION, SOLUTION INTRAVENOUS at 13:45

## 2023-04-24 RX ADMIN — FENTANYL CITRATE 150 MCG: 50 INJECTION, SOLUTION INTRAMUSCULAR; INTRAVENOUS at 07:53

## 2023-04-24 RX ADMIN — HEPARIN SODIUM 5000 UNITS: 5000 INJECTION, SOLUTION INTRAVENOUS; SUBCUTANEOUS at 06:54

## 2023-04-24 RX ADMIN — FENTANYL CITRATE 50 MCG: 50 INJECTION, SOLUTION INTRAMUSCULAR; INTRAVENOUS at 14:21

## 2023-04-24 RX ADMIN — INSULIN ASPART 4 UNITS: 100 INJECTION, SOLUTION INTRAVENOUS; SUBCUTANEOUS at 18:36

## 2023-04-24 RX ADMIN — Medication 3 G: at 06:55

## 2023-04-24 RX ADMIN — Medication 50 MG: at 07:53

## 2023-04-24 RX ADMIN — ONDANSETRON 4 MG: 2 INJECTION INTRAMUSCULAR; INTRAVENOUS at 12:33

## 2023-04-24 RX ADMIN — PROPOFOL 200 MG: 10 INJECTION, EMULSION INTRAVENOUS at 07:53

## 2023-04-24 RX ADMIN — SODIUM CHLORIDE: 9 INJECTION, SOLUTION INTRAVENOUS at 18:22

## 2023-04-24 RX ADMIN — HYDROMORPHONE HYDROCHLORIDE 0.4 MG: 0.2 INJECTION, SOLUTION INTRAMUSCULAR; INTRAVENOUS; SUBCUTANEOUS at 14:31

## 2023-04-24 RX ADMIN — FENTANYL CITRATE 50 MCG: 50 INJECTION, SOLUTION INTRAMUSCULAR; INTRAVENOUS at 12:16

## 2023-04-24 RX ADMIN — HYDROMORPHONE HYDROCHLORIDE 0.2 MG: 0.2 INJECTION, SOLUTION INTRAMUSCULAR; INTRAVENOUS; SUBCUTANEOUS at 18:22

## 2023-04-24 RX ADMIN — Medication 50 MG: at 08:58

## 2023-04-24 RX ADMIN — ACETAMINOPHEN 975 MG: 325 TABLET ORAL at 18:23

## 2023-04-24 RX ADMIN — FENTANYL CITRATE 50 MCG: 50 INJECTION, SOLUTION INTRAMUSCULAR; INTRAVENOUS at 09:39

## 2023-04-24 RX ADMIN — DEXAMETHASONE SODIUM PHOSPHATE 4 MG: 4 INJECTION, SOLUTION INTRA-ARTICULAR; INTRALESIONAL; INTRAMUSCULAR; INTRAVENOUS; SOFT TISSUE at 08:04

## 2023-04-24 RX ADMIN — HYDROMORPHONE HYDROCHLORIDE 0.4 MG: 0.2 INJECTION, SOLUTION INTRAMUSCULAR; INTRAVENOUS; SUBCUTANEOUS at 14:53

## 2023-04-24 RX ADMIN — LIDOCAINE HYDROCHLORIDE 100 MG: 20 INJECTION, SOLUTION INFILTRATION; PERINEURAL at 07:53

## 2023-04-24 RX ADMIN — SODIUM CHLORIDE, POTASSIUM CHLORIDE, SODIUM LACTATE AND CALCIUM CHLORIDE: 600; 310; 30; 20 INJECTION, SOLUTION INTRAVENOUS at 10:25

## 2023-04-24 RX ADMIN — FENTANYL CITRATE 50 MCG: 50 INJECTION, SOLUTION INTRAMUSCULAR; INTRAVENOUS at 14:10

## 2023-04-24 ASSESSMENT — ACTIVITIES OF DAILY LIVING (ADL)
ADLS_ACUITY_SCORE: 18
ADLS_ACUITY_SCORE: 24
ADLS_ACUITY_SCORE: 18
ADLS_ACUITY_SCORE: 18

## 2023-04-24 NOTE — ANESTHESIA CARE TRANSFER NOTE
Patient: Saleem Wilde    Procedure: Procedure(s):  LEFT NEPHRECTOMY, ROBOT-ASSISTED, ULTRASOUND LEFT       Diagnosis: Non-functioning kidney [N28.9]  Diagnosis Additional Information: No value filed.    Anesthesia Type:   General     Note:    Oropharynx: oropharynx clear of all foreign objects and spontaneously breathing  Level of Consciousness: awake and drowsy  Oxygen Supplementation: face mask  Level of Supplemental Oxygen (L/min / FiO2): 8  Independent Airway: airway patency satisfactory and stable  Dentition: dentition unchanged  Vital Signs Stable: post-procedure vital signs reviewed and stable  Report to RN Given: handoff report given  Patient transferred to: PACU    Handoff Report: Identifed the Patient, Identified the Reponsible Provider, Reviewed the pertinent medical history, Discussed the surgical course, Reviewed Intra-OP anesthesia mangement and issues during anesthesia, Set expectations for post-procedure period and Allowed opportunity for questions and acknowledgement of understanding      Vitals:  Vitals Value Taken Time   /63 04/24/23 1400   Temp     Pulse 82 04/24/23 1408   Resp 20 04/24/23 1408   SpO2 100 % 04/24/23 1408   Vitals shown include unvalidated device data.    Electronically Signed By: LIBIA Jean CRNA  April 24, 2023  2:10 PM

## 2023-04-24 NOTE — BRIEF OP NOTE
[facility]    Brief Operative Note    Pre-operative diagnosis: Non-functioning kidney [N28.9]  Post-operative diagnosis Same as pre-operative diagnosis    Procedure: Procedure(s):  LEFT NEPHRECTOMY, ROBOT-ASSISTED, ULTRASOUND LEFT  Surgeon: Surgeon(s) and Role:     * Javy Kang MD - Primary     * Ezra Queen MD - Resident - Assisting  Anesthesia: General   Estimated Blood Loss: 50 mL from 4/24/2023  7:43 AM to 4/24/2023  1:57 PM      Drains: 16 fr vallejo  Specimens:   ID Type Source Tests Collected by Time Destination   1 : Left adrenal nodule  Tissue Adrenal Gland, Left SURGICAL PATHOLOGY EXAM Javy Kang MD 4/24/2023 12:17 PM    2 : left kidney Tissue Kidney, Left SURGICAL PATHOLOGY EXAM Javy Kang MD 4/24/2023  1:06 PM      Findings:   None.  Complications: None.  Implants: * No implants in log *     SubQ hep in AM

## 2023-04-24 NOTE — ANESTHESIA PREPROCEDURE EVALUATION
Pre-Operative H & P     CC:  Preoperative exam to assess for increased cardiopulmonary risk while undergoing surgery and anesthesia.    Date of Encounter: 4/10/2023  Primary Care Physician:  Linh Galindo     Reason for visit:   No diagnosis found.    HPI  Saleem Wilde is a 47 year old male who presents for pre-operative H & P in preparation for  Procedure Information     Case: [id number] Date/Time: 04/24/23 0730    Procedure: NEPHRECTOMY, ROBOT-ASSISTED, ULTRASOUND LEFT (Left: Abdomen)    Anesthesia type: General    Diagnosis: Non-functioning kidney [N28.9]    Pre-op diagnosis: Non-functioning kidney [N28.9]    Location: [facility] OR 04 /  OR    Providers: Javy Kang MD          Mr. Wilde has a PMH significant for congenital obstruction of UPJ, ventral hernia, and insulin dependent diabetes. His left kidney is essentially non-functioning, he currently has left PNT, and the above procedure is planned.    History is obtained from the patient and chart review    Hx of abnormal bleeding or anti-platelet use: denies      Past Medical History  Past Medical History:   Diagnosis Date     Benign essential hypertension      Diabetes mellitus (H)      Ventral hernia        Past Surgical History  Past Surgical History:   Procedure Laterality Date     CYSTOSCOPY, URETEROSCOPY, COMBINED Left 2/14/2022    Procedure: Cystoscopy, left retrograde ureteroscopy pyelogram, with ureteral biopsies;  Surgeon: Javy Kang MD;  Location: [facility] OR       Prior to Admission Medications  No current outpatient medications on file.       Allergies  Allergies   Allergen Reactions     Adhesive Tape Other (See Comments)     Causes burns and blisters       Social History  Social History     Socioeconomic History     Marital status: Single     Spouse name: Not on file     Number of children: Not on file     Years of education: Not on file     Highest education level: Not on file   Occupational History     Not on file   Tobacco  "Use     Smoking status: Some Days     Types: Cigarettes     Smokeless tobacco: Never     Tobacco comments:     <1 pk per week, trying to quit   Vaping Use     Vaping status: Not on file   Substance and Sexual Activity     Alcohol use: Not Currently     Comment: quit drinking one month ago     Drug use: Yes     Types: Marijuana     Comment: Gummies for sleep     Sexual activity: Not on file   Other Topics Concern     Not on file   Social History Narrative     Not on file     Social Determinants of Health     Financial Resource Strain: Not on file   Food Insecurity: Not on file   Transportation Needs: Not on file   Physical Activity: Not on file   Stress: Not on file   Social Connections: Not on file   Intimate Partner Violence: Not on file   Housing Stability: Not on file       Family History  Family History   Problem Relation Age of Onset     Anesthesia Reaction No family hx of      Venous thrombosis No family hx of        Review of Systems  The complete review of systems is negative other than noted in the HPI or here.     Anesthesia Evaluation   Pt has had prior anesthetic.     History of anesthetic complications   pt states he \"crashed\" after bladder surgery at 8 years old.  No issues since that time.    ROS/MED HX  ENT/Pulmonary:     (+) KUNAL risk factors, obese, tobacco use, Current use,  (-) asthma and recent URI   Neurologic:  - neg neurologic ROS  (-) no seizures and no CVA   Cardiovascular:     (+) Dyslipidemia hypertension-----Previous cardiac testing   Echo: Date: 2/9/2021 Results:  Interpretation Summary   Normal left ventricular chamber size, wall thickness and systolic function. LVEF 63%. No regional wall motion abnormalities.   Normal diastolic function.   Normal right ventricular chamber size and systolic function. Normal pulmonary and mildly elevated right atrial pressures.   Valves: no significant valvular abnormalities.   No pericardial effusion.   Normal ascending aorta.   Stress Test: Date: " Results:    ECG Reviewed: Date: 2/4/22 Results:  NSR w/ incomplete RBBB  Cath: Date: Results:      METS/Exercise Tolerance: >4 METS Comment: Walks to and from work every other day which is 8 -9 blocks   Hematologic:  - neg hematologic  ROS   (+) history of blood transfusion, no previous transfusion reaction,  (-) history of blood clots   Musculoskeletal:  - neg musculoskeletal ROS     GI/Hepatic:    (-) GERD   Renal/Genitourinary: Comment: Left PNT    (+) renal disease,     Endo:     (+) type II DM, Last HgA1c: 8.9, date: 4/10/23, Using insulin, Normal glucose range: 180-190, Diabetic complications: nephropathy. Obesity,  (-) thyroid disease   Psychiatric/Substance Use:  - neg psychiatric ROS     Infectious Disease:  - neg infectious disease ROS     Malignancy:  - neg malignancy ROS     Other:  - neg other ROS          There were no vitals taken for this visit.    Physical Exam   Constitutional: Awake, alert, cooperative, no apparent distress, and appears stated age.  Eyes: Pupils equal, round and reactive to light, extra ocular muscles intact, sclera clear, conjunctiva normal.  HENT: Normocephalic, oral pharynx with moist mucus membranes, edentulous. No goiter appreciated.   Respiratory: Clear to auscultation bilaterally, no crackles or wheezing.  Cardiovascular: Regular rate and rhythm, normal S1 and S2, and no murmur noted.  Carotids +2, no bruits. No edema. Palpable pulses to radial and PT arteries.   GI: protuberant abdomen, low. Due to large ventral hernia  Lymph/Hematologic: No cervical lymphadenopathy and no supraclavicular lymphadenopathy.  Genitourinary:  deferred  Skin: Warm and dry.   Musculoskeletal: Full ROM of neck. There is no redness, warmth, or swelling of the joints. Gross motor strength is normal.    Neurologic: Awake, alert, oriented to name, place and time. Cranial nerves II-XII are grossly intact.    Neuropsychiatric: Calm, cooperative. Normal affect.     Prior Labs/Diagnostic Studies   All  labs and imaging personally reviewed     Component      Latest Ref Rng 4/10/2023   WBC      4.0 - 11.0 10e3/uL 6.9    RBC Count      4.40 - 5.90 10e6/uL 4.97    Hemoglobin      13.3 - 17.7 g/dL 13.2 (L)    Hematocrit      40.0 - 53.0 % 41.6    MCV      78 - 100 fL 84    MCH      26.5 - 33.0 pg 26.6    MCHC      31.5 - 36.5 g/dL 31.7    RDW      10.0 - 15.0 % 15.3 (H)    Platelet Count      150 - 450 10e3/uL 185       Legend:  (L) Low  (H) High    Component      Latest Ref Rng 4/10/2023   Sodium      136 - 145 mmol/L 137    Potassium      3.4 - 5.3 mmol/L 4.5    Chloride      98 - 107 mmol/L 100    Carbon Dioxide (CO2)      22 - 29 mmol/L 29    Anion Gap      7 - 15 mmol/L 8    Urea Nitrogen      6.0 - 20.0 mg/dL 19.7    Creatinine      0.67 - 1.17 mg/dL 0.70    Calcium      8.6 - 10.0 mg/dL 9.2    Glucose      70 - 99 mg/dL 269 (H)    GFR Estimate      >60 mL/min/1.73m2 >90       Legend:  (H) High    Component      Latest Ref Rng 4/10/2023   Hemoglobin A1C      <5.7 % 8.9 (H)       Legend:  (H) High      EKG/ echo/stress test - if available please see in ROS above       The patient's records and results personally reviewed by this provider.     Outside records reviewed from: Care Everywhere    LAB/DIAGNOSTIC STUDIES TODAY:  A1c, CBC, BMP, Type and Screen    Assessment      Saleem Wilde is a 47 year old male seen as a PAC referral for risk assessment and optimization for anesthesia.    Plan/Recommendations  Pt will be optimized for the proposed procedure.  See below for details on the assessment, risk, and preoperative recommendations    NEUROLOGY  - No history of TIA, CVA or seizure    -Post Op delirium risk factors:  No risk identified    ENT  - No current airway concerns.  Will need to be reassessed day of surgery.  Mallampati: II  TM: > 3    CARDIAC  - denies chest pain, chest pressure, SOB, ANTHONY  - echo 2021 as above in ROS, EF 63%, no wall motion abnormalities, no valvular abnormalities, normal pulmonary  "pressures    - METS (Metabolic Equivalents)  Patient performs 4 or more METS exercise without symptoms            Total Score: 0      RCRI-Moderate risk: Class 3  6.6% complication rate            Total Score: 2    RCRI: High Risk Surgery    RCRI: Diabetes        PULMONARY    KUNAL Medium Risk            Total Score: 4    KUNAL: Snores loudly    KUNAL: Observed stopped breathing    KUNAL: BMI over 35 kg/m2    KUNAL: Male      - Denies asthma or inhaler use  - Tobacco History      History   Smoking Status     Some Days     Types: Cigarettes   Smokeless Tobacco     Never       GI  - denies GERD  PONV Low Risk  Total Score: 1           1 AN PONV: Intended Post Op Opioids       - large ventral hernia, not eligible for surgical repair without weight loss first    /RENAL  - Baseline Creatinine 0.70  - h/o left UPJ obstruction s/p stent and then subsequent left percutaneous nephrostomy tube placement (still present)  - Split renal function 73.7% Right, 26.3% Left    ENDOCRINE    - BMI: Estimated body mass index is 50.53 kg/m  as calculated from the following:    Height as of 4/10/23: 1.702 m (5' 7\").    Weight as of 4/10/23: 146.3 kg (322 lb 9.6 oz).  Class 3 Obesity (BMI > 40)    - last A1c 12/26/22 of 12.1%, recheck today  - A1c today 8.9%, staff message sent to Dr. Kang. Delay for further optimization or is this time sensitive?  Diabetes Type 2, insulin dependent. Hold morning oral hypoglycemic medications and short acting insulin DOS. Take 80% of last scheduled dose of long-acting insulin prior to procedure.  Recommend close monitoring of the patient's blood glucose levels throughout the perioperative period.    HEME  VTE Low Risk 0.5%            Total Score: 3    VTE: BMI greater than 39    VTE: Male      - No history of abnormal bleeding or antiplatelet use.      Different anesthesia methods/types have been discussed with the patient, but they are aware that the final plan will be decided by the assigned anesthesia " provider on the date of service.        The patient is optimized for their procedure. AVS with information on surgery time/arrival time, meds and NPO status given by nursing staff. No further diagnostic testing indicated.      On the day of service:     Prep time: 12 minutes  Visit time: 10 minutes  Documentation time: 12 minutes  ------------------------------------------  Total time: 34 minutes      Kaylie Gamino PA-C  Preoperative Assessment Center  [facility]  Clinic and Surgery Center  Phone: [phone]  Fax: [phone]    Physical Exam    Airway        Mallampati: III   TM distance: > 3 FB   Neck ROM: full   Mouth opening: > 3 cm    Respiratory Devices and Support         Dental       (+) Modest Abnormalities - crowns, retainers, 1 or 2 missing teeth      Cardiovascular   cardiovascular exam normal       Rhythm and rate: regular and normal     Pulmonary   pulmonary exam normal        breath sounds clear to auscultation             Anesthesia Plan    ASA Status:  3   NPO Status:  NPO Appropriate    Anesthesia Type: General.     - Airway: ETT   Induction: Intravenous.   Maintenance: Balanced.   Techniques and Equipment:     - Lines/Monitors: 2nd IV, BIS     - Drips/Meds: Phenylephrine     Consents    Anesthesia Plan(s) and associated risks, benefits, and realistic alternatives discussed. Questions answered and patient/representative(s) expressed understanding.    - Discussed:     - Discussed with:  Patient      - Extended Intubation/Ventilatory Support Discussed: No.      - Patient is DNR/DNI Status: No    Use of blood products discussed: Yes.     - Discussed with: Patient.     - Consented: consented to blood products            Reason for refusal: other.     Postoperative Care    Pain management: Multi-modal analgesia.   PONV prophylaxis: Ondansetron (or other 5HT-3), Dexamethasone or Solumedrol     Comments:

## 2023-04-24 NOTE — LETTER
Transition Communication Hand-off for Care Transitions to Next Level of Care Provider    Name: Saleem Wilde  : 1975  MRN #: [id number]  Primary Care Provider: Linh Galindo     Primary Clinic: [address] [address]     Reason for Hospitalization:  Non-functioning kidney [N28.9]  Post-operative state [Z98.890]  Admit Date/Time: 2023  6:07 AM  Discharge Date: 2023  Payor Source: Payor: MEDICA / Plan: MEDICA CHOICE IFB / Product Type: Indemnity /         Reason for Communication Hand-off Referral: Other care transition    Discharge Plan: Home       Concern for non-adherence with plan of care:   no  Discharge Needs Assessment:  Needs      Flowsheet Row Most Recent Value   Equipment Currently Used at Home none            Follow-up plan:    Future Appointments   Date Time Provider Department Center   2023 10:20 AM Javy Kang MD [facility]       Any outstanding tests or procedures:              Key Recommendations:        Jeanna Jacobs RN    AVS/Discharge Summary is the source of truth; this is a helpful guide for improved communication of patient story

## 2023-04-24 NOTE — OP NOTE
PREOPERATIVE DIAGNOSIS: Left atrophic kidney.    POSTOPERATIVE DIAGNOSIS: Left atrophic kidney.  PROCEDURES PERFORMED:   1. Left robot assisted laparoscopic nephrectomy  2.Removal of left nephrostomy tube.  3. Lysis of adhesions      STAFF SURGEON: Javy Kang MD  ASSISTANT: Ezra Queen MD  ANESTHESIA: GET  ESTIMATED BLOOD LOSS: 50 mL.   IV FLUIDS: see dictated anesthesia record  COMPLICATIONS: None.   SPECIMEN: Left kidney, Left adrenal nodule (sent for frozen pathology, negative for malignancy).  DRAINS/TUBES:   16 Fr Ventura catheter  SIGNIFICANT FINDINGS: Large ventral hernia.    BRIEF OPERATIVE INDICATIONS: Saleem Wilde is a 47 year old male with large midline ventral hernia, morbid obesity, diabetes, left UPJ obstruction with poorly functional left kidney (17% 2/27/23 Lasix Renogram at [facility]), managed with left PNT. After discussion with patient about the risks, benefits, and alternatives of surgery he elected to proceed for left nephrectomy.  DESCRIPTION OF PROCEDURE:  Informed consent was obtained and the patient was brought to the operating room where general anesthesia was induced. he was given appropriate preoperative antibiotics within 1 hour of incision. he  was then positioned in the full flank position with Left side up where all  pressure points were carefully padded and secured. he was then prepped and draped in the usual sterile fashion. We then performed a timeout, verifying the correct patient's site and procedure to be performed. The large ventral hernia was supported well by bed extension and was well out of our way.  We began by obtaining entry via Jung technique without any complication left and lateral to midline. Insufflation occurred without issue. We then placed 2 more ports and assistant port to triangulate the left kidney.  There were significant adhesions requiring 45 min to take down.  This was complicated by his obesity and very large ventral hernia making  this a very challenging procedure.  We docked the robot.  We began by first mobilizing the colon from that the lateral aspect of the abdominal wall and reflecting it medially. Gerota's fascia was then opened and the lower pole of the kidney mobilized. The gonadal vessels were identified and divided. The ureter was also identified and divided with Hem-o-lock clips after removing the entire nephroureteral stent. We then traced these back to the hilum. We were able to identify 1 renal vein. The vein were carefully dissected and freed from surrounding strctures. We were also able to visualize 2 renal arteries as well cephalad and posterior to the vein which was also dissected free from surrounding fat. We also noted an adrenal nodule that was removed, sent for frozen pathology and returned negative for malignancy. Notably, this dissection was difficult due to inflamed planes. We also cut the nephroureteral stent and removed this tube in one piece and intact from skin side.   We divided the renal artery with a 45 mm Endo-SATYA stapler. A second load was used for the vein. We divided the second renal artery with another load. We then continued to march up towards the upper pole where the remaining vessels were divided with clips.   The adrenal was seen and spared during the dissection.  At this point we began to mobilize the kidney posteriorly and free of all lateral attachments until it was free circumferentially and was placed into a 15mm endocatch bag. Surgical vincenzo was placed on spleen and in kidney bed.  The robot was undocked. The specimen was extracted through an extension of camera port (Jung) by only extending the incision 3 cm - tissue was morcellated by hand and removed from bag without issue. This was done given large ventral hernia in attempt to prevent further hernia formation. We closed this extraction incision in 2 layers (posterior fascia with 0-vicryl, anterior with 0-PDS). The incisions were irrigated.  Assistant port was closed with figure-of-eight 0-vicryl. Skin incisions were all closed with 4-0 monocryl. The wounds were dressed with surgical glue. The patient was then awakened and taken to the PACU in stable condition.   Ezra Queen MD  Urology Resident    Attestation:  I was present for the entire procedure on 4/24/23.  NARENDRA Kang MD

## 2023-04-24 NOTE — ANESTHESIA PROCEDURE NOTES
Airway       Patient location during procedure: OR       Procedure Start/Stop Times: 4/24/2023 7:56 AM  Staff -        Anesthesiologist:  Luciano Cameron MD       CRNA: Taya Turner APRN CRNA       Performed By: CRNA  Consent for Airway        Urgency: elective  Indications and Patient Condition       Indications for airway management: kelton-procedural       Induction type:intravenous       Mask difficulty assessment: 2 - vent by mask + OA or adjuvant +/- NMBA    Final Airway Details       Final airway type: endotracheal airway       Successful airway: ETT - single and Oral  Endotracheal Airway Details        ETT size (mm): 8.0       Cuffed: yes       Successful intubation technique: direct laryngoscopy       DL Blade Type: MAC 3       Grade View of Cords: 1       Adjucts: stylet       Position: Right       Measured from: lips       Secured at (cm): 23       Bite block used: None    Post intubation assessment        Placement verified by: capnometry, equal breath sounds and chest rise        Number of attempts at approach: 1       Number of other approaches attempted: 0       Secured with: plastic tape and pink tape       Ease of procedure: easy       Dentition: Intact and Unchanged    Medication(s) Administered   Medication Administration Time: 4/24/2023 7:56 AM    Additional Comments       Ramped patient for intubation

## 2023-04-24 NOTE — ANESTHESIA POSTPROCEDURE EVALUATION
Patient: Saleem Wilde    Procedure: Procedure(s):  LEFT NEPHRECTOMY, ROBOT-ASSISTED, ULTRASOUND LEFT       Anesthesia Type:  General    Note:  Disposition: Inpatient; ICU            ICU Sign Out: Anesthesiologist/ICU physician sign out WAS performed   Postop Pain Control: Uneventful            Sign Out: Well controlled pain   PONV: No   Neuro/Psych: Uneventful            Sign Out: Acceptable/Baseline neuro status   Airway/Respiratory: Uneventful            Sign Out: Acceptable/Baseline resp. status   CV/Hemodynamics: Uneventful            Sign Out: Acceptable CV status; No obvious hypovolemia; No obvious fluid overload   Other NRE: NONE   DID A NON-ROUTINE EVENT OCCUR? No           Last vitals:  Vitals Value Taken Time   /60 04/24/23 1500   Temp 36.4  C (97.5  F) 04/24/23 1400   Pulse 83 04/24/23 1509   Resp 19 04/24/23 1509   SpO2 97 % 04/24/23 1509   Vitals shown include unvalidated device data.    Electronically Signed By: Tomás Barker MD  April 24, 2023  3:10 PM

## 2023-04-24 NOTE — ANESTHESIA PROCEDURE NOTES
Arterial Line Procedure Note    Pre-Procedure   Staff -        Anesthesiologist:  Luciano Cameron MD       Performed By: anesthesiologist       Location: OR       Pre-Anesthestic Checklist: patient identified, IV checked, risks and benefits discussed, informed consent, monitors and equipment checked, pre-op evaluation and at physician/surgeon's request  Timeout:       Correct Patient: Yes        Correct Procedure: Yes        Correct Site: Yes        Correct Position: Yes   Line Placement:   This line was placed Post Induction  Procedure   Procedure: arterial line, new line and elective       Laterality: left       Insertion Site: radial.  Sterile Prep        Standard elements of sterile barrier followed       Skin prep: Chloraprep  Insertion/Injection        Technique: ultrasound guided        1. Ultrasound was used to evaluate the access site.       2. Artery evaluated via ultrasound for patency/adequacy.       3. Using real-time ultrasound the needle/catheter was observed entering the artery/vein.       6. There were no apparent abnormal pathologic findings.       Catheter Type/Size: 20 G, 12 cm  Narrative         Secured by: anchor securement device       Tegaderm dressing used.       Complications: None apparent,        Arterial waveform: Yes        IBP within 10% of NIBP: Yes       [facility]     Rosy Abebe 11, APRN-CNP  [facility]  [phone]

## 2023-04-25 VITALS
SYSTOLIC BLOOD PRESSURE: 155 MMHG | RESPIRATION RATE: 16 BRPM | TEMPERATURE: 99.8 F | WEIGHT: 310.19 LBS | HEART RATE: 106 BPM | OXYGEN SATURATION: 95 % | DIASTOLIC BLOOD PRESSURE: 79 MMHG | BODY MASS INDEX: 48.58 KG/M2

## 2023-04-25 LAB
ANION GAP SERPL CALCULATED.3IONS-SCNC: 13 MMOL/L (ref 7–15)
BUN SERPL-MCNC: 14.4 MG/DL (ref 6–20)
CALCIUM SERPL-MCNC: 8.6 MG/DL (ref 8.6–10)
CHLORIDE SERPL-SCNC: 100 MMOL/L (ref 98–107)
CREAT SERPL-MCNC: 0.7 MG/DL (ref 0.67–1.17)
DEPRECATED HCO3 PLAS-SCNC: 20 MMOL/L (ref 22–29)
ERYTHROCYTE [DISTWIDTH] IN BLOOD BY AUTOMATED COUNT: 15.4 % (ref 10–15)
GFR SERPL CREATININE-BSD FRML MDRD: >90 ML/MIN/1.73M2
GLUCOSE BLDC GLUCOMTR-MCNC: 154 MG/DL (ref 70–99)
GLUCOSE BLDC GLUCOMTR-MCNC: 236 MG/DL (ref 70–99)
GLUCOSE SERPL-MCNC: 151 MG/DL (ref 70–99)
HCT VFR BLD AUTO: 40.7 % (ref 40–53)
HGB BLD-MCNC: 12.9 G/DL (ref 13.3–17.7)
MCH RBC QN AUTO: 26.7 PG (ref 26.5–33)
MCHC RBC AUTO-ENTMCNC: 31.7 G/DL (ref 31.5–36.5)
MCV RBC AUTO: 84 FL (ref 78–100)
PLATELET # BLD AUTO: 239 10E3/UL (ref 150–450)
POTASSIUM SERPL-SCNC: 5.3 MMOL/L (ref 3.4–5.3)
RBC # BLD AUTO: 4.83 10E6/UL (ref 4.4–5.9)
SODIUM SERPL-SCNC: 133 MMOL/L (ref 136–145)
WBC # BLD AUTO: 10.1 10E3/UL (ref 4–11)

## 2023-04-25 PROCEDURE — 85027 COMPLETE CBC AUTOMATED: CPT | Performed by: STUDENT IN AN ORGANIZED HEALTH CARE EDUCATION/TRAINING PROGRAM

## 2023-04-25 PROCEDURE — 250N000013 HC RX MED GY IP 250 OP 250 PS 637: Performed by: PHYSICIAN ASSISTANT

## 2023-04-25 PROCEDURE — 250N000013 HC RX MED GY IP 250 OP 250 PS 637: Performed by: STUDENT IN AN ORGANIZED HEALTH CARE EDUCATION/TRAINING PROGRAM

## 2023-04-25 PROCEDURE — 80048 BASIC METABOLIC PNL TOTAL CA: CPT | Performed by: STUDENT IN AN ORGANIZED HEALTH CARE EDUCATION/TRAINING PROGRAM

## 2023-04-25 PROCEDURE — 250N000011 HC RX IP 250 OP 636: Performed by: STUDENT IN AN ORGANIZED HEALTH CARE EDUCATION/TRAINING PROGRAM

## 2023-04-25 PROCEDURE — G0463 HOSPITAL OUTPT CLINIC VISIT: HCPCS

## 2023-04-25 PROCEDURE — 999N000157 HC STATISTIC RCP TIME EA 10 MIN

## 2023-04-25 PROCEDURE — 258N000003 HC RX IP 258 OP 636: Performed by: STUDENT IN AN ORGANIZED HEALTH CARE EDUCATION/TRAINING PROGRAM

## 2023-04-25 PROCEDURE — 94660 CPAP INITIATION&MGMT: CPT

## 2023-04-25 PROCEDURE — 36415 COLL VENOUS BLD VENIPUNCTURE: CPT | Performed by: STUDENT IN AN ORGANIZED HEALTH CARE EDUCATION/TRAINING PROGRAM

## 2023-04-25 RX ORDER — ACETAMINOPHEN 325 MG/1
650 TABLET ORAL EVERY 4 HOURS PRN
Qty: 100 TABLET | Refills: 0 | Status: SHIPPED | OUTPATIENT
Start: 2023-04-25

## 2023-04-25 RX ORDER — OXYCODONE HYDROCHLORIDE 5 MG/1
5 TABLET ORAL EVERY 4 HOURS PRN
Qty: 16 TABLET | Refills: 0 | Status: SHIPPED | OUTPATIENT
Start: 2023-04-25

## 2023-04-25 RX ORDER — AMOXICILLIN 250 MG
1-2 CAPSULE ORAL 2 TIMES DAILY
Qty: 45 TABLET | Refills: 1 | Status: SHIPPED | OUTPATIENT
Start: 2023-04-25

## 2023-04-25 RX ADMIN — OXYCODONE HYDROCHLORIDE 5 MG: 5 TABLET ORAL at 08:25

## 2023-04-25 RX ADMIN — ACETAMINOPHEN 975 MG: 325 TABLET ORAL at 09:31

## 2023-04-25 RX ADMIN — ACETAMINOPHEN 975 MG: 325 TABLET ORAL at 02:12

## 2023-04-25 RX ADMIN — OXYCODONE HYDROCHLORIDE 5 MG: 5 TABLET ORAL at 02:12

## 2023-04-25 RX ADMIN — METFORMIN HYDROCHLORIDE 1000 MG: 500 TABLET, FILM COATED ORAL at 12:17

## 2023-04-25 RX ADMIN — POLYETHYLENE GLYCOL 3350 17 G: 17 POWDER, FOR SOLUTION ORAL at 08:10

## 2023-04-25 RX ADMIN — HYDROMORPHONE HYDROCHLORIDE 0.2 MG: 0.2 INJECTION, SOLUTION INTRAMUSCULAR; INTRAVENOUS; SUBCUTANEOUS at 06:25

## 2023-04-25 RX ADMIN — INSULIN ASPART 4 UNITS: 100 INJECTION, SOLUTION INTRAVENOUS; SUBCUTANEOUS at 12:17

## 2023-04-25 RX ADMIN — INSULIN ASPART 1 UNITS: 100 INJECTION, SOLUTION INTRAVENOUS; SUBCUTANEOUS at 08:09

## 2023-04-25 RX ADMIN — HYDROMORPHONE HYDROCHLORIDE 0.2 MG: 0.2 INJECTION, SOLUTION INTRAMUSCULAR; INTRAVENOUS; SUBCUTANEOUS at 00:37

## 2023-04-25 RX ADMIN — OXYCODONE HYDROCHLORIDE 5 MG: 5 TABLET ORAL at 16:48

## 2023-04-25 RX ADMIN — SODIUM CHLORIDE: 9 INJECTION, SOLUTION INTRAVENOUS at 04:03

## 2023-04-25 RX ADMIN — SENNOSIDES AND DOCUSATE SODIUM 1 TABLET: 50; 8.6 TABLET ORAL at 08:10

## 2023-04-25 RX ADMIN — OXYCODONE HYDROCHLORIDE 5 MG: 5 TABLET ORAL at 12:25

## 2023-04-25 ASSESSMENT — ACTIVITIES OF DAILY LIVING (ADL)
ADLS_ACUITY_SCORE: 26
ADLS_ACUITY_SCORE: 24
ADLS_ACUITY_SCORE: 26
ADLS_ACUITY_SCORE: 24

## 2023-04-25 NOTE — PLAN OF CARE
A&O X4. Hypertensive otherwise VSS. On Room air.  Pain is well managed with tylenol and prn oxy. Ventura removed and patient is voiding spontaneously. No BM this shift. Ambulated in the hallway X1. Up in the chair most of this shift. Advanced to regular diet- tolerating well. Open wound in abdominal fold is covered with polymem and interdry was placed in abdominal fold. Left flank dressing with dry drainage but is intact. Lap sites ANGELA. Pt was able to place polymem on lower abdominal wound independently. BG checks ACHS with sliding scale insulin. PIV X2 are saline locked. Discharge tonight.

## 2023-04-25 NOTE — PROGRESS NOTES
Admitted/transferred from: PACU    2 RN skin assessment completed by Carmen James RN and JOSE Jarrett RN  Skin assessment findin lap sites with skin glue, nephrostomy tube removal site, moist, redness and open areas under abdominal skin folds  Interventions/actions: Interdry under abdominal folds, dressing on tube removal site WOC consult requested    Will continue to monitor.

## 2023-04-25 NOTE — PLAN OF CARE
Goal Outcome Evaluation:     4x A/O, review discharge paperwork and instructions, questions answered. Rx ready at pharm. Pain managed with oxycodone and tylenol. Ambulates with walker.

## 2023-04-25 NOTE — CONSULTS
[facility] Nurse Inpatient Assessment     Consulted for: pannus    Patient History (according to provider note(s):      47 year old male s/p left robotic nephrectomy and removal of left PNT for symptomatic and atrophic left kidney with Dr Kang.      Assessment:      Areas visualized during today's visit: Focused: and Abdomen    Wound location: under abdominal pannus        Last photo: 4/25/23  Wound due to: Unknown Etiology  Wound history/plan of care: Small wound in base of pannus skin fold and patient has large ventral hernia- wound likely related to moisture (MASD)  Wound base: 100 % fibrin vs slough      Palpation of the wound bed: normal      Drainage: small     Description of drainage: serous     Measurements (length x width x depth, in cm): 1.4  x 1  x  0.1 cm      Tunneling: N/A     Undermining: N/A  Periwound skin: Intact, Erythema- blanchable, Macerated and Superficial erosion      Color: normal and consistent with surrounding tissue and red      Temperature: normal   Odor: mild  Pain: mild and during dressing change, tender  Pain interventions prior to dressing change: slow and gentle cares   Treatment goal: Heal  and Protection  STATUS: initial assessment  Supplies ordered: ordered polymem 2x2 bandage and interdry, discussed with RN and discussed with patient       Treatment Plan:     Under abdominal pannus wound and skin care: Every other day  And PRN when soiled  1. Wash skin gently with soap and water. Pat dry, do not rub.  2. Apply polymem bandage (519223) over open wound  3. For moisture management under abdominal folds: Cut the appropriate size of interdry fabric with clean scissors, allowing a minimum of 2 inches of the fabric exposed outside the skin fold for moisture evaporation.  4. Lay a single layer of fabric in the skin fold, placing one edge of the fabric into the base of the fold. Gently smooth the rest of the fabric over the skin,  keeping it flat. Leave at least 2 inches of the fabric exposed outside the skin fold.  5. Secure the fabric in one of several ways; with the skin fold, with a small amount of tape, or tucked under clothing.  When to Dispose: Each piece of InterDry may be used up to 5 days, depending on fabric soiling, odor, amount of moisture and general skin condition. May label with skin marker to date    Removal: Remove the fabric before bathing and reuse when finished. When removing the fabric from skin folds, gently separate the skin fold and lift away fabric.       Orders: Written    RECOMMEND PRIMARY TEAM ORDER: None, at this time  Education provided: plan of care and wound progress  Discussed plan of care with: Patient and Nurse  [facility] nurse follow-up plan: weekly  Notify WOC if wound(s) deteriorate.  Nursing to notify the Provider(s) and re-consult the [facility] Nurse if new skin concern.    DATA:     Current support surface: Standard  Standard gel/foam mattress (IsoFlex, Atmos air, etc)  Containment of urine/stool: Incontinent pad in bed  BMI: There is no height or weight on file to calculate BMI.   Active diet order: Orders Placed This Encounter      Regular Diet Adult     Output: I/O last 3 completed shifts:  In: 3384.67 [P.O.:120; I.V.:3264.67]  Out: 2260 [Urine:2210; Blood:50]     Labs: Recent Labs   Lab 04/24/23  1747 04/24/23  0737   HGB 13.9 12.5*   WBC  --  7.8     Pressure injury risk assessment:   Sensory Perception: 4-->no impairment  Moisture: 4-->rarely moist  Activity: 3-->walks occasionally  Mobility: 3-->slightly limited  Nutrition: 3-->adequate  Friction and Shear: 3-->no apparent problem  Slick Score: 20    Renetta Pat RN CWOCN  Pager no longer is use, please contact through [employer] group: [facility] Nurse  Dept. Office Number: *[phone]

## 2023-04-25 NOTE — PROGRESS NOTES
Urology  Progress Note    NAEO  Pain well controlled; requiring dilaudid for pain this AM  Tolerating clears - no n/v  Not passing gas  Not ambulating  Vallejo in place    Exam  /55 (BP Location: Left arm, Cuff Size: Adult Large)   Pulse 96   Temp 97.2  F (36.2  C) (Oral)   Resp 20   SpO2 94%   No acute distress  Unlabored breathing  Abdomen soft,  appropriately tender, nondistended, previous ventral hernia present and unchanged. Incisions c/d/i with surgical glue  Vallejo with clear yellow urine in tubing      UOP 1660/550    Labs  Recent Labs   Lab Test 04/24/23  1747 04/24/23  0737 04/10/23  1325 08/20/21  0853   WBC  --  7.8 6.9  --    HGB 13.9 12.5* 13.2*  --    CR  --   --  0.70 0.60*        AM labs pending    Assessment/Plan  47 year old male POD#1 s/p left robotic nephrectomy and removal of left PNT for symptomatic and atrophic left kidney .     Neuro: Tylenol, prn oxy/dilaudid for pain control  CV: AMY  Pulm: IS while awake  FEN/GI: regular diet. Bowel regimen.  Endo: SSI  : vallejo to be removed today once ambulating   Heme/ID: Hgb pending  Activity: Up ad garfield  PPx: SCDs. SQH in AM if Hgb stable   Dispo: med/surg; likely discharge later today if tolerating regular diet, ambulating, pain controlled on PO medications and vallejo removed    Seen and examined with the chief resident. Will discuss with Dr. Kang.    Flako Mcqueen MD  Urology Resident     Contacting the Urology Team     Please use the following job codes to reach the Urology Team. Note that you must use an in house phone and that job codes cannot receive text pages.     On weekdays, dial [phone] (or star-star-star [phone] on the new [employer] telephones) then [phone] to reach the Adult Urology resident or PA on call    On weekdays, dial [phone] (or star-star-star [phone] on the new [employer] telephones) then [phone] to reach the Pediatric Urology resident    On weeknights and weekends, dial [phone] (or star-star-star [phone] on the new [employer] telephones) then [phone] to reach the  Urology resident on call (for both Adult and Pediatrics)

## 2023-04-25 NOTE — DISCHARGE INSTRUCTIONS
Under abdominal pannus wound and skin care: Every other day  And PRN when soiled  Wash skin gently with soap and water. Pat dry, do not rub.  Apply polymem bandage ([id number]) over open wound  For moisture management under abdominal folds: Cut the appropriate size of interdry fabric with clean scissors, allowing a minimum of 2 inches of the fabric exposed outside the skin fold for moisture evaporation.  Lay a single layer of fabric in the skin fold, placing one edge of the fabric into the base of the fold. Gently smooth the rest of the fabric over the skin, keeping it flat. Leave at least 2 inches of the fabric exposed outside the skin fold.  Secure the fabric in one of several ways; with the skin fold, with a small amount of tape, or tucked under clothing.  When to Dispose: Each piece of InterDry may be used up to 5 days, depending on fabric soiling, odor, amount of moisture and general skin condition. May label with skin marker to date

## 2023-04-25 NOTE — DISCHARGE SUMMARY
[facility] UroDischarge Summary    Patient: Saleem Wilde    MRN: [id number]   : 1975         Date of Admission:  2023   Date of Discharge::  2023  Admitting Physician:  Javy Kang MD  Discharge Physician:  Bee Barahona PA-C             Admission Diagnoses:   Non-functioning kidney [N28.9]  S/p left nephrectomy  Solitary right kidney  Post-operative pain    Past Medical History:   Diagnosis Date     Benign essential hypertension      Diabetes mellitus (H)      Ventral hernia              Discharge Diagnosis:     Non-functioning kidney [N28.9]  S/p left nephrectomy  Solitary right kidney  Post-operative pain    Past Medical History:   Diagnosis Date     Benign essential hypertension      Diabetes mellitus (H)      Ventral hernia           Procedures:     Procedure(s): 23 -   PROCEDURES PERFORMED:   1. Left robot assisted laparoscopic nephrectomy  2.Removal of left nephrostomy tube.  3. Lysis of adhesions  Dr. Javy Kang (Urology             Medications Prior to Admission:     Medications Prior to Admission   Medication Sig Dispense Refill Last Dose     atorvastatin (LIPITOR) 40 MG tablet Take 1 tablet by mouth daily        blood glucose (RELION PRIME TEST) test strip Test blood sugar before meals and at bedtime as directed        Blood Glucose Monitoring Suppl (RELION PRIME MONITOR) DARVIN 1 Device        ibuprofen (ADVIL/MOTRIN) 200 MG tablet Take 400 mg by mouth every 6 hours as needed        LANTUS SOLOSTAR 100 UNIT/ML soln Inject 45 Units Subcutaneous At Bedtime        metFORMIN (GLUCOPHAGE) 500 MG tablet Take 1,000 mg by mouth 2 times daily (with meals)        order for DME Abdominal Binder - Large 2 each 3      RELION ULTRA THIN LANCETS MISC                 Discharge Medications:     Current Discharge Medication List      START taking these medications    Details   acetaminophen (TYLENOL) 325 MG tablet Take 2 tablets (650 mg) by mouth every 4 hours as  needed for pain  Qty: 100 tablet, Refills: 0    Associated Diagnoses: Postoperative pain      oxyCODONE (ROXICODONE) 5 MG tablet Take 1 tablet (5 mg) by mouth every 4 hours as needed for moderate to severe pain  Qty: 16 tablet, Refills: 0    Associated Diagnoses: Postoperative pain      senna-docusate (SENOKOT-S/PERICOLACE) 8.6-50 MG tablet Take 1-2 tablets by mouth 2 times daily To prevent/ treat constipation  Qty: 45 tablet, Refills: 1    Associated Diagnoses: Postoperative pain         CONTINUE these medications which have NOT CHANGED    Details   atorvastatin (LIPITOR) 40 MG tablet Take 1 tablet by mouth daily      blood glucose (RELION PRIME TEST) test strip Test blood sugar before meals and at bedtime as directed      Blood Glucose Monitoring Suppl (RELION PRIME MONITOR) DARVIN 1 Device      ibuprofen (ADVIL/MOTRIN) 200 MG tablet Take 400 mg by mouth every 6 hours as needed      LANTUS SOLOSTAR 100 UNIT/ML soln Inject 45 Units Subcutaneous At Bedtime      metFORMIN (GLUCOPHAGE) 500 MG tablet Take 1,000 mg by mouth 2 times daily (with meals)      order for DME Abdominal Binder - Large  Qty: 2 each, Refills: 3    Associated Diagnoses: Ventral hernia without obstruction or gangrene      RELION ULTRA THIN LANCETS MISC                    Consultations:   Consultation during this admission received:   WOUND OSTOMY CONTINENCE NURSE  IP CONSULT  PHYSICAL THERAPY ADULT IP CONSULT          Brief History of Illness:   Reason for admission requiring a surgical or invasive procedure:   Non-functioning kidney [N28.9]   The patient underwent the following procedure(s):   See above   There were no immediate complications during this procedure.    Please refer to the full operative summary for details.           Hospital Course:   The patient's hospital course was unremarkable.  Saleem LETICIA Chani recovered as anticipated and experienced no post-operative complications.     On POD #1 he was ambulating without assitance,  tolerating the discharge diet, had pain controlled with PO medications to go home with, and was requiring no IV medications or fluids. His Ventura was removed and he voided without difficulty. He was discharged to home with appropriate contact information, follow-up and instructions as seen below in the discharge paperwork.  Prior to discharge he was seen by the wound ostomy nurse for a PTA wound between his abdominal folds.  Cleaning with soap and water then application of polymem bandage and use of interdry was recommended.  See [facility] note for details.           Discharge Labs:     No results found for: PSA  Recent Labs   Lab 04/24/23  1747 04/24/23  0737   WBC  --  7.8   HGB 13.9 12.5*   PLT  --  225     Recent Labs   Lab 04/25/23  0808 04/25/23  0737   NA  --  133*   POTASSIUM  --  5.3   CHLORIDE  --  100   CO2  --  20*   BUN  --  14.4   CR  --  0.70   * 151*   GUILLE  --  8.6     No lab results found in last 7 days.    Invalid input(s): URINEBLOOD  Results for orders placed or performed in visit on 04/10/23   Urine Culture    Specimen: Urine, Midstream   Result Value Ref Range    Culture >100,000 CFU/mL Candida tropicalis (A)             Discharge Instructions and Follow-Up:   Diet:  - Consistent carbohydrate/ diabetes diet    Activity:   - No strenuous exercise for 4 weeks.   - No lifting, pushing, pulling more than 15 pounds for 4 weeks.   - Do not strain with bowel movements.   - Do not drive until you can press the brake pedal quickly and fully without pain.   - Do not operate a motor vehicle while taking narcotic pain medications.     Medications:   - PAIN: Oxycodone is a Opiate medication that has been prescribed for pain.  Opiates will cause sleepiness and constipation and can become addictive, therefore it is best to stop or reduce them as soon as you can.  Any left over Opiates should be disposed of with an Authorized  for unneeded medications.  Contact your [facility]'s or [facility]'s  household trash and recycling service to learn about medication disposal options and guidelines for your area.  If you decide to store this medication at home it should be kept in a locked cabinet to prevent access to children or visitors. Never drive, operate machinery or drink alcoholic beverages while you are taking Opiate pain medications.  To reduce your Opiate use, take Tylenol (acetaminophen 625mg) as directed.  This has been prescribed for you.  Do not take more than 3,200mg of Tylenol (acetaminophen/ APAP) from all sources in any 24 hour period since this can cause liver damage.  Do not take more than 1800mg of ibuprofen in any 24 hour period since this can cause kidney damage.    2) CONSTIPATION: Pericolace (senna/docusate sodium) can be taken twice daily for prevention of constipation since surgery, pain medications and bladder spasm medications can all make you constipated.  Please reduce or stop pericolace if you develop loose stools. Other over the counter solutions such as prune juice, miralax, fiber products, senna, and dulcolax can also be used. If you are taking the pericolace but still have not had a bowel movement in 3 days, start over-the-counter Milk of Magnesia taken twice daily until you have a good result.  Call the office with any concerns.     3) DIABETES MEDICATION:  - Surgery can cause your blood sugars to change for a variety of reasons - for some patients, blood sugars get higher than usual.  For others, blood sugars can get low.  Once you are discharged, please check sugars four times daily for the first few days.  Adjust insulin levels depending on dietary intake and blood sugar levels.    - If your blood sugars are running high (> 180mg/dL) reach out to your endocrinologist or PCP to help adjust insulin dosing.   - Monitor for signs of hypoglycemia (Excessive sweating, tiredness, lightheadedness, feeling dizzy and weak, being pale, a sudden feeling of excess hunger, Increased heart  "rate, blurred vision, confusion, Irritability or nervousness).  Keep glucose tablets or juice on hand in case symptoms develop.     Incisions:   1) SURGICAL INCISIONS  - You may shower and get incisions wet in the shower starting 48 hrs after surgery.  - Do not scrub incisions or submerge wounds in a bath, pool, hot tub, etc. for 3-4 weeks or until incisions have fully healed.   - Your incisions were closed with dissolvable suture that will not need to be removed.  Commonly, purple dermabond glue is applied over the top of the sutures.  Avoid using lotions or ointments on your incisions.    - Leave incisions open to air.  Cover with gauze only if needed for comfort or to protect clothing from drainage.     2) CHRONIC ABDOMINAL WOUND (between skin folds)  - Follow care instructions from the wound nurse    Follow-Up:  - Schedule an appointment to be seen by your primary care provider within 5-7 days of discharge for a postoperative checkup.  Discuss your blood sugars and recent surgery.   - Follow up with Dr. Kang as scheduled on 5/12/23 for a postop checkup and to discuss pathology   - Call or return sooner than your regularly scheduled visit if you develop any of the following:  Fever (greater than 101.3F) or flu-like symptoms, uncontrolled pain, uncontrolled nausea or vomiting, as well as increased redness, swelling, or drainage from your wound or any difficulties passing urine.   - Drink 2-3L of water a day to keep your urine clear to light pink in color. Some light blood in your urine can occur but should clear with increased water consumption as instructed.      Phone numbers:   - Monday through Friday 8am to 4:30pm: Call [phone] with questions, requests for medication refills, or to schedule or confirm an appointment.  - Nights, weekends, or holidays: call the after hours emergency pager - [phone] and tell the  \"I would like to page the Urology Resident on call.\" Typically, the on-call " "provider should return your call within 30 minutes.  Please page the on-call provider again if you haven't been contacted as expected.  Rarely, the on-call provider will be unable to promptly return a call due to a hospital emergency.  If you have paged twice and are still not contacted, ask the hospital  to page the \"urology CHIEF-RESIDENT on call\".   - Please note that due to prescribing laws, resident physicians are unable to prescribe narcotics after-hours. If you feel as though you will need a refill of a narcotic pain medication, you will need to call the clinic during business hours OR seek emergency care.  - For emergencies, call 911         Discharge Disposition:     Discharged to home      Attestation: I have reviewed today's vital signs, notes, medications, labs and imaging.    Elin Barahona PA-C  Urology Physician Assistant  Personal Pager: [phone]    Please call Job Code:   [phone] to reach the Urology resident or PA on call - Weekdays  [phone] to reach the Urology resident or PA on call - Weeknights and weekends    April 25, 2023         "

## 2023-04-25 NOTE — PHARMACY-ADMISSION MEDICATION HISTORY
Pharmacist Admission Medication History    Admission medication history is complete. The information provided in this note is only as accurate as the sources available at the time of the update.    Medication reconciliation/reorder completed by provider prior to medication history? No    Information Source(s): Clinic records, Hospital records and CareEverywhere/SureScripts via N/A    Pertinent Information:     Changes made to PTA medication list:    Added: None    Deleted: None    Changed: None    Medication Affordability:       Allergies reviewed with patient and updates made in EHR: no    Medication History Completed By: Jorge Aburto RPH 4/25/2023 12:43 PM    Prior to Admission medications    Medication Sig Last Dose Taking? Auth Provider Long Term End Date   acetaminophen (TYLENOL) 325 MG tablet Take 2 tablets (650 mg) by mouth every 4 hours as needed for pain  Yes Bee Barahona PA     atorvastatin (LIPITOR) 40 MG tablet Take 1 tablet by mouth daily 4/24/2023 Yes Unknown, Entered By History No    LANTUS SOLOSTAR 100 UNIT/ML soln Inject 45 Units Subcutaneous At Bedtime 4/24/2023 Yes Reported, Patient Yes    oxyCODONE (ROXICODONE) 5 MG tablet Take 1 tablet (5 mg) by mouth every 4 hours as needed for moderate to severe pain  Yes Bee Barahona PA     senna-docusate (SENOKOT-S/PERICOLACE) 8.6-50 MG tablet Take 1-2 tablets by mouth 2 times daily To prevent/ treat constipation  Yes Bee Barahona PA     blood glucose (RELION PRIME TEST) test strip Test blood sugar before meals and at bedtime as directed   Reported, Patient     Blood Glucose Monitoring Suppl (RELION PRIME MONITOR) DARVIN 1 Device   Reported, Patient     ibuprofen (ADVIL/MOTRIN) 200 MG tablet Take 400 mg by mouth every 6 hours as needed   Reported, Patient     metFORMIN (GLUCOPHAGE) 500 MG tablet Take 1,000 mg by mouth 2 times daily (with meals)   Reported, Patient No    order for DME Abdominal Binder - Dusty Piper MD      RELION ULTRA THIN LANCETS MISC    Reported, Patient

## 2023-04-25 NOTE — PLAN OF CARE
Goal Outcome Evaluation:  POD0 Left robot assisted laparoscopic nephrectomy, removal of left nephrostomy tube, lysis of adhesions  VS: /70 (BP Location: Right arm, Cuff Size: Adult Large)   Pulse 92   Temp 97.3  F (36.3  C) (Oral)   Resp 20   SpO2 94%   Neuro: A&O x4, able to make needs known  Respiratory: CPAP in place, no report of SOB  GI/: Ventura in place with adequate output, BS hypoactive, no gas or BM at this time.  Diet/appetite: Clear liquid diet, tolerating well and no report of nausea  Activity: Not oob this shift  Pain: Abdominal pain that radiates to the back, managed with PRN oxycodone and IV dilaudid, scheduled tylenol.  Skin/drains: 4 lap sites with skin glue, nephrostomy removal site with dressing. Moist and couple open areas under abdominal skin folds, WOC consulted  Lines: PIV x3, R PIV infusing NS @100ml/hr.   No change to patient's status this shift, monitor for ROBF and continue POC.

## 2023-04-25 NOTE — PLAN OF CARE
Goal Outcome Evaluation:      Plan of Care Reviewed With: patient    POD# 1 for Left robot assisted laparoscopic nephrectomy, removal of left nephrostomy tube, lysis of adhesions.    A/O x4. VSS. Using C-PAP overnight. On clear liquid diet. Denies nausea. Ventura in place with adequate output. No gas or BM. Abdominal pain managed with IV Dilaudid an oxycodone. Abd lap sites with skin glue and nephrostomy removal site with dressing CDI. Moist open areas under abdominal skin folds. PIV x3 with R infusing NS. Cont POC.

## 2023-04-26 ENCOUNTER — PATIENT OUTREACH (OUTPATIENT)
Dept: CARE COORDINATION | Facility: CLINIC | Age: 48
End: 2023-04-26
Payer: COMMERCIAL

## 2023-04-26 NOTE — PROGRESS NOTES
"Clinic Care Coordination Contact  [facility]: Post-Discharge Note  SITUATION                                                      Admission:    Admission Date: 04/24/23   Reason for Admission: Non-functioning kidney  Discharge:   Discharge Date: 04/25/23  Discharge Diagnosis: Non-functioning kidney, S/p left nephrectomy, Solitary right kidney, Post-operative pain. PROCEDURES PERFORMED:  1. Left robot assisted laparoscopic nephrectomy  2.Removal of left nephrostomy tube.  3. Lysis of adhesions ( Dr. Javy Kang Urology)    BACKGROUND                                                      Per hospital discharge summary and inpatient provider notes:    MARC Wilde is a 47 year old male who presents for pre-operative H & P in preparation for       Procedure Information      Case: [id number] Date/Time: 04/24/23 0730     Procedure: NEPHRECTOMY, ROBOT-ASSISTED, ULTRASOUND LEFT (Left: Abdomen)     Anesthesia type: General     Diagnosis: Non-functioning kidney [N28.9]     Pre-op diagnosis: Non-functioning kidney [N28.9]     Location: [facility] OR  /  OR     Providers: Javy Kang MD             Mr. Wilde has a PMH significant for congenital obstruction of UPJ, ventral hernia, and insulin dependent diabetes. His left kidney is essentially non-functioning, he currently has left PNT, and the above procedure is planned.     History is obtained from the patient and chart review     ASSESSMENT      Discharge Assessment  How are you doing now that you are home?: \"I'm doing good, I'm just fine.\"  How are your symptoms? (Red Flag symptoms escalate to triage hotline per guidelines): Improved  Do you feel your condition is stable enough to be safe at home until your provider visit?: Yes  Does the patient have their discharge instructions? : Yes  Does the patient have questions regarding their discharge instructions? : No  Were you started on any new medications or were there changes to any of your previous " medications? : Yes  Does the patient have all of their medications?: Yes  Do you have questions regarding any of your medications? : No  Do you have all of your needed medical supplies or equipment (DME)?  (i.e. oxygen tank, CPAP, cane, etc.): Yes  Discharge follow-up appointment scheduled within 14 calendar days? : Yes  Discharge Follow Up Appointment Date: 05/12/23  Discharge Follow Up Appointment Scheduled with?: Specialty Care Provider (Urology appt.)    Post-op ([facility] Only)  If the patient had a surgery or procedure, do they have any questions for a nurse?: No    PLAN                                                      Outpatient Plan:      Follow-Up:  - Schedule an appointment to be seen by your primary care provider within 5-7 days of discharge for a postoperative checkup.  Discuss your blood sugars and recent surgery.   - Follow up with Dr. Kang as scheduled on 5/12/23 for a postop checkup and to discuss pathology   - Call or return sooner than your regularly scheduled visit if you develop any of the following:  Fever (greater than 101.3F) or flu-like symptoms, uncontrolled pain, uncontrolled nausea or vomiting, as well as increased redness, swelling, or drainage from your wound or any difficulties passing urine.   - Drink 2-3L of water a day to keep your urine clear to light pink in color. Some light blood in your urine can occur but should clear with increased water consumption as instructed.           Future Appointments   Date Time Provider Department Center   5/12/2023 10:20 AM Javy Kang MD [facility]         For any urgent concerns, please contact our 24 hour nurse triage line: [phone] ([address] [place] [address])         RICHA Nelson  [phone]  [facility]

## 2023-04-28 LAB
PATH REPORT.COMMENTS IMP SPEC: NORMAL
PATH REPORT.FINAL DX SPEC: NORMAL
PATH REPORT.GROSS SPEC: NORMAL
PATH REPORT.INTRAOP OBS SPEC DOC: NORMAL
PATH REPORT.MICROSCOPIC SPEC OTHER STN: NORMAL
PATH REPORT.RELEVANT HX SPEC: NORMAL
PHOTO IMAGE: NORMAL

## 2023-05-09 ENCOUNTER — PRE VISIT (OUTPATIENT)
Dept: UROLOGY | Facility: CLINIC | Age: 48
End: 2023-05-09
Payer: COMMERCIAL

## 2023-05-09 NOTE — TELEPHONE ENCOUNTER
Reason for visit: Post op     Relevant information: s/p nephrectomy    Records/imaging/labs/orders: in epic    Pt called: no    At Rooming: normal

## 2023-05-12 ENCOUNTER — OFFICE VISIT (OUTPATIENT)
Dept: UROLOGY | Facility: CLINIC | Age: 48
End: 2023-05-12
Payer: COMMERCIAL

## 2023-05-12 VITALS
HEIGHT: 67 IN | WEIGHT: 310 LBS | BODY MASS INDEX: 48.65 KG/M2 | HEART RATE: 76 BPM | DIASTOLIC BLOOD PRESSURE: 87 MMHG | SYSTOLIC BLOOD PRESSURE: 140 MMHG

## 2023-05-12 DIAGNOSIS — Q62.39 CONGENITAL OBSTRUCTION OF URETEROPELVIC JUNCTION: Primary | ICD-10-CM

## 2023-05-12 PROCEDURE — 99024 POSTOP FOLLOW-UP VISIT: CPT | Performed by: UROLOGY

## 2023-05-12 ASSESSMENT — PAIN SCALES - GENERAL: PAINLEVEL: NO PAIN (0)

## 2023-05-12 NOTE — LETTER
5/12/2023       RE: Saleem Wilde  [address] [address]     Dear Colleague,    Thank you for referring your patient, Saleem Wilde, to the [facility] [place] at [facility]. Please see a copy of my visit note below.    Mr. Duke is here for follow-up after his 4/24/2023 left nephrectomy for nonfunctional kidney.  His pathology showed a nonfunctional kidney.  There is also an adrenal mass which we removed which was benign.  His incisions are healing well and there is no hernia at any of the incision sites.  He feels much better and notes that a lot of the pain he was suffering from previously has since resolved.    I will plan to see him back on an as-needed basis.  I did let Dr. Murcia know that the surgery went well and needs a left nephrostomy tube      Again, thank you for allowing me to participate in the care of your patient.      Sincerely,    Javy Kang MD

## 2023-05-12 NOTE — NURSING NOTE
"Chief Complaint   Patient presents with     Follow Up     Post op       Blood pressure (!) 140/87, pulse 76, height 1.702 m (5' 7\"), weight 140.6 kg (310 lb). Body mass index is 48.55 kg/m .    Patient Active Problem List   Diagnosis     Congenital obstruction of ureteropelvic junction     Health care directive on file     Anxiety     Intertrigo     Chronic abdominal wound infection     COVID-19 virus infection     Essential hypertension     History of alcohol abuse     History of small bowel obstruction     Hydronephrosis of left kidney     Incisional abscess, subsequent encounter     Morbid obesity (H)     MRSA (methicillin resistant Staphylococcus aureus)     Non-compliant patient     Ventral hernia without obstruction or gangrene     Pyelonephritis of left kidney     S/P hernia repair     Tobacco use disorder     Type 2 diabetes mellitus, with long-term current use of insulin (H)     Vitamin D deficiency     Post-operative state       Allergies   Allergen Reactions     Adhesive Tape Other (See Comments)     Causes burns and blisters       Current Outpatient Medications   Medication Sig Dispense Refill     acetaminophen (TYLENOL) 325 MG tablet Take 2 tablets (650 mg) by mouth every 4 hours as needed for pain 100 tablet 0     atorvastatin (LIPITOR) 40 MG tablet Take 1 tablet by mouth daily       blood glucose (RELION PRIME TEST) test strip Test blood sugar before meals and at bedtime as directed       Blood Glucose Monitoring Suppl (RELION PRIME MONITOR) DARVIN 1 Device       ibuprofen (ADVIL/MOTRIN) 200 MG tablet Take 400 mg by mouth every 6 hours as needed       LANTUS SOLOSTAR 100 UNIT/ML soln Inject 45 Units Subcutaneous At Bedtime       metFORMIN (GLUCOPHAGE) 500 MG tablet Take 1,000 mg by mouth 2 times daily (with meals)       order for DME Abdominal Binder - Large 2 each 3     RELION ULTRA THIN LANCETS MISC        senna-docusate (SENOKOT-S/PERICOLACE) 8.6-50 MG tablet Take 1-2 tablets by mouth 2 times daily " To prevent/ treat constipation 45 tablet 1     oxyCODONE (ROXICODONE) 5 MG tablet Take 1 tablet (5 mg) by mouth every 4 hours as needed for moderate to severe pain (Patient not taking: Reported on 5/12/2023) 16 tablet 0       Social History     Tobacco Use     Smoking status: Some Days     Types: Cigarettes     Smokeless tobacco: Never     Tobacco comments:     <1 pk per week, trying to quit   Substance Use Topics     Alcohol use: Not Currently     Comment: quit drinking one month ago     Drug use: Yes     Types: Marijuana     Comment: Gummies for sleep       Mary Sharif  5/12/2023  10:41 AM

## 2023-05-12 NOTE — LETTER
May 12, 2023      RE: Saleem Wilde  [address] [address]        To whom it may concern:    Saleem Wilde is under my professional care for Congenital obstruction of ureteropelvic junction He  may return to work with the following: The employee is ABLE to return to work today.    When the patient returns to work, the following restrictions apply until 6/5/23:  A) Bend: Occasionally (1-3 hours)  B) Squat: Occasionally (1-3 hours)  C) Walk/Stand: Occasionally (1-3 hours)  D) Reach Above Shoulders: Occasionally (1-3 hours)  E) Lift, carry, push, and pull no more than:  11-20 lbs. Light duty only.      Sincerely,      Javy Kang MD

## 2023-05-12 NOTE — PROGRESS NOTES
Mr. Duke is here for follow-up after his 4/24/2023 left nephrectomy for nonfunctional kidney.  His pathology showed a nonfunctional kidney.  There is also an adrenal mass which we removed which was benign.  His incisions are healing well and there is no hernia at any of the incision sites.  He feels much better and notes that a lot of the pain he was suffering from previously has since resolved.    I will plan to see him back on an as-needed basis.  I did let Dr. Murcia know that the surgery went well and needs a left nephrostomy tube

## 2024-07-09 NOTE — BRIEF OP NOTE
[facility]    Brief Operative Note    Pre-operative diagnosis: Stricture or kinking of ureter [N13.5]  Post-operative diagnosis Same as pre-operative diagnosis    Procedure: Procedure(s):  Cystoscopy, left retrograde ureteroscopy pyelogram, with ureteral biopsies  Surgeon: Surgeon(s) and Role:     * Javy Kang MD - Primary     * Mane Rogers MD - Resident - Assisting  Anesthesia: General   Estimated Blood Loss: 1 mL    Drains: None  Specimens:   ID Type Source Tests Collected by Time Destination   1 : Left ureteral BIOPSY Tissue Ureter, Left SURGICAL PATHOLOGY EXAM Javy Kang MD 2/14/2022  9:35 AM    2 :  Washings Pelvis, Left NON-GYNECOLOGIC CYTOLOGY Javy Kang MD 2/14/2022  9:36 AM      Findings:   Short region of edematous, abnormal tissue at left UPJ, biopsied. Left renal collecting system with PNT in place, mild edematous changes in renal pelvis. High riding bladder neck, diverticulum just lateral to left UO.  Complications: None.  Implants: * No implants in log *         09-Jul-2024 11:30

## 2025-03-18 NOTE — H&P
Pre-Operative H & P     CC:  Preoperative exam to assess for increased cardiopulmonary risk while undergoing surgery and anesthesia.    Date of Encounter: 4/10/2023  Primary Care Physician:  Linh Galindo     Reason for visit:   Encounter Diagnoses   Name Primary?     Preop examination      Non-functioning kidney      Type 2 diabetes mellitus with other diabetic kidney complication, with long-term current use of insulin (H) Yes       HPI  Saleem Wilde is a 47 year old male who presents for pre-operative H & P in preparation for  Procedure Information     Case: [id number] Date/Time: 04/24/23 0730    Procedure: NEPHRECTOMY, ROBOT-ASSISTED, ULTRASOUND LEFT (Left: Abdomen)    Anesthesia type: General with Block    Diagnosis: Non-functioning kidney [N28.9]    Pre-op diagnosis: Non-functioning kidney [N28.9]    [facility]    Providers: Javy Kang MD          Mr. Wilde has a PMH significant for congenital obstruction of UPJ, ventral hernia, and insulin dependent diabetes. His left kidney is essentially non-functioning, he currently has left PNT, and the above procedure is planned.    History is obtained from the patient and chart review    Hx of abnormal bleeding or anti-platelet use: denies      Past Medical History  Past Medical History:   Diagnosis Date     Benign essential hypertension      Diabetes mellitus (H)      Ventral hernia        Past Surgical History  Past Surgical History:   Procedure Laterality Date     CYSTOSCOPY, URETEROSCOPY, COMBINED Left 2/14/2022    Procedure: Cystoscopy, left retrograde ureteroscopy pyelogram, with ureteral biopsies;  Surgeon: Javy Kang MD;  Location: [facility]       Prior to Admission Medications  Current Outpatient Medications   Medication Sig Dispense Refill     ibuprofen (ADVIL/MOTRIN) 200 MG tablet Take 400 mg by mouth every 6 hours as needed       LANTUS SOLOSTAR 100 UNIT/ML soln Inject 34 Units Subcutaneous At Bedtime       metFORMIN  Currently on anastrozole 1 mg daily   "(GLUCOPHAGE) 500 MG tablet Take 1,000 mg by mouth 2 times daily (with meals)       blood glucose (RELION PRIME TEST) test strip Test blood sugar before meals and at bedtime as directed       Blood Glucose Monitoring Suppl (RELION PRIME MONITOR) DARVIN 1 Device       order for DME Abdominal Binder - Large 2 each 3     RELION ULTRA THIN LANCETS MISC          Allergies  Allergies   Allergen Reactions     Adhesive Tape Other (See Comments)     Causes burns and blisters       Social History  Social History     Socioeconomic History     Marital status: Single     Spouse name: Not on file     Number of children: Not on file     Years of education: Not on file     Highest education level: Not on file   Occupational History     Not on file   Tobacco Use     Smoking status: Some Days     Types: Cigarettes     Smokeless tobacco: Never     Tobacco comments:     <1 pk per week, trying to quit   Vaping Use     Vaping status: Not on file   Substance and Sexual Activity     Alcohol use: Not Currently     Comment: quit drinking one month ago     Drug use: Yes     Types: Marijuana     Comment: Gummies for sleep     Sexual activity: Not on file   Other Topics Concern     Not on file   Social History Narrative     Not on file     Social Determinants of Health     Financial Resource Strain: Not on file   Food Insecurity: Not on file   Transportation Needs: Not on file   Physical Activity: Not on file   Stress: Not on file   Social Connections: Not on file   Intimate Partner Violence: Not on file   Housing Stability: Not on file       Family History  Family History   Problem Relation Age of Onset     Anesthesia Reaction No family hx of      Venous thrombosis No family hx of        Review of Systems  The complete review of systems is negative other than noted in the HPI or here.     Anesthesia Evaluation   Pt has had prior anesthetic.     History of anesthetic complications   pt states he \"crashed\" after bladder surgery at 8 years old.  No " "issues since that time.    ROS/MED HX  ENT/Pulmonary:     (+) KUNAL risk factors, obese, tobacco use, Current use,  (-) asthma and recent URI   Neurologic:  - neg neurologic ROS  (-) no seizures and no CVA   Cardiovascular:     (+) Dyslipidemia hypertension-----Previous cardiac testing   Echo: Date: 2/9/2021 Results:  Interpretation Summary   Normal left ventricular chamber size, wall thickness and systolic function. LVEF 63%. No regional wall motion abnormalities.   Normal diastolic function.   Normal right ventricular chamber size and systolic function. Normal pulmonary and mildly elevated right atrial pressures.   Valves: no significant valvular abnormalities.   No pericardial effusion.   Normal ascending aorta.   Stress Test: Date: Results:    ECG Reviewed: Date: Results:    Cath: Date: Results:      METS/Exercise Tolerance: >4 METS Comment: Walks to and from work every other day which is 8 -9 blocks   Hematologic:  - neg hematologic  ROS   (+) history of blood transfusion, no previous transfusion reaction,  (-) history of blood clots   Musculoskeletal:  - neg musculoskeletal ROS     GI/Hepatic:    (-) GERD   Renal/Genitourinary: Comment: Left PNT    (+) renal disease,     Endo:     (+) type II DM, Last HgA1c: 8.9, date: 4/10/23, Using insulin, Normal glucose range: 180-190, Obesity,  (-) thyroid disease   Psychiatric/Substance Use:  - neg psychiatric ROS     Infectious Disease:  - neg infectious disease ROS     Malignancy:  - neg malignancy ROS     Other:  - neg other ROS          BP (!) 142/82 (BP Location: Right arm, Patient Position: Sitting, Cuff Size: Adult Large)   Pulse 78   Temp 98.1  F (36.7  C) (Oral)   Resp 16   Ht 1.702 m (5' 7\")   Wt 146.3 kg (322 lb 9.6 oz)   SpO2 98%   BMI 50.53 kg/m      Physical Exam   Constitutional: Awake, alert, cooperative, no apparent distress, and appears stated age.  Eyes: Pupils equal, round and reactive to light, extra ocular muscles intact, sclera clear, " conjunctiva normal.  HENT: Normocephalic, oral pharynx with moist mucus membranes, edentulous. No goiter appreciated.   Respiratory: Clear to auscultation bilaterally, no crackles or wheezing.  Cardiovascular: Regular rate and rhythm, normal S1 and S2, and no murmur noted.  Carotids +2, no bruits. No edema. Palpable pulses to radial and PT arteries.   GI: protuberant abdomen, low. Due to large ventral hernia  Lymph/Hematologic: No cervical lymphadenopathy and no supraclavicular lymphadenopathy.  Genitourinary:  deferred  Skin: Warm and dry.   Musculoskeletal: Full ROM of neck. There is no redness, warmth, or swelling of the joints. Gross motor strength is normal.    Neurologic: Awake, alert, oriented to name, place and time. Cranial nerves II-XII are grossly intact.    Neuropsychiatric: Calm, cooperative. Normal affect.     Prior Labs/Diagnostic Studies   All labs and imaging personally reviewed     Component      Latest Ref Rng 4/10/2023   WBC      4.0 - 11.0 10e3/uL 6.9    RBC Count      4.40 - 5.90 10e6/uL 4.97    Hemoglobin      13.3 - 17.7 g/dL 13.2 (L)    Hematocrit      40.0 - 53.0 % 41.6    MCV      78 - 100 fL 84    MCH      26.5 - 33.0 pg 26.6    MCHC      31.5 - 36.5 g/dL 31.7    RDW      10.0 - 15.0 % 15.3 (H)    Platelet Count      150 - 450 10e3/uL 185       Legend:  (L) Low  (H) High    Component      Latest Ref Rng 4/10/2023   Sodium      136 - 145 mmol/L 137    Potassium      3.4 - 5.3 mmol/L 4.5    Chloride      98 - 107 mmol/L 100    Carbon Dioxide (CO2)      22 - 29 mmol/L 29    Anion Gap      7 - 15 mmol/L 8    Urea Nitrogen      6.0 - 20.0 mg/dL 19.7    Creatinine      0.67 - 1.17 mg/dL 0.70    Calcium      8.6 - 10.0 mg/dL 9.2    Glucose      70 - 99 mg/dL 269 (H)    GFR Estimate      >60 mL/min/1.73m2 >90       Legend:  (H) High    Component      Latest Ref Rng 4/10/2023   Hemoglobin A1C      <5.7 % 8.9 (H)       Legend:  (H) High      EKG/ echo/stress test - if available please see in ROS  above       The patient's records and results personally reviewed by this provider.     Outside records reviewed from: Care Everywhere    LAB/DIAGNOSTIC STUDIES TODAY:  A1c, CBC, BMP, Type and Screen    Assessment      Saleem Wilde is a 47 year old male seen as a PAC referral for risk assessment and optimization for anesthesia.    Plan/Recommendations  Pt will be optimized for the proposed procedure.  See below for details on the assessment, risk, and preoperative recommendations    NEUROLOGY  - No history of TIA, CVA or seizure    -Post Op delirium risk factors:  No risk identified    ENT  - No current airway concerns.  Will need to be reassessed day of surgery.  Mallampati: II  TM: > 3    CARDIAC  - denies chest pain, chest pressure, SOB, ANTHONY  - echo 2021 as above in ROS, EF 63%, no wall motion abnormalities, no valvular abnormalities, normal pulmonary pressures    - METS (Metabolic Equivalents)  Patient performs 4 or more METS exercise without symptoms            Total Score: 0      RCRI-Moderate risk: Class 3  6.6% complication rate            Total Score: 2    RCRI: High Risk Surgery    RCRI: Diabetes        PULMONARY    KUNAL Medium Risk            Total Score: 4    KUNAL: Snores loudly    KUNAL: Observed stopped breathing    KUNAL: BMI over 35 kg/m2    KUNAL: Male      - Denies asthma or inhaler use  - Tobacco History      History   Smoking Status     Some Days     Types: Cigarettes   Smokeless Tobacco     Never       GI  - denies GERD  PONV Low Risk  Total Score: 1           1 AN PONV: Intended Post Op Opioids       - large ventral hernia, not eligible for surgical repair without weight loss first    /RENAL  - Baseline Creatinine 0.70  - h/o left UPJ obstruction s/p stent and then subsequent left percutaneous nephrostomy tube placement (still present)  - Split renal function 73.7% Right, 26.3% Left    ENDOCRINE    - BMI: Estimated body mass index is 50.53 kg/m  as calculated from the following:    Height as of  "this encounter: 1.702 m (5' 7\").    Weight as of this encounter: 146.3 kg (322 lb 9.6 oz).  Class 3 Obesity (BMI > 40)    - last A1c 12/26/22 of 12.1%, recheck today  - A1c today 8.9%, staff message sent to Dr. Kang. Delay for further optimization or is this time sensitive?  Diabetes Type 2, insulin dependent. Hold morning oral hypoglycemic medications and short acting insulin DOS. Take 80% of last scheduled dose of long-acting insulin prior to procedure.  Recommend close monitoring of the patient's blood glucose levels throughout the perioperative period.    HEME  VTE Low Risk 0.5%            Total Score: 3    VTE: BMI greater than 39    VTE: Male      - No history of abnormal bleeding or antiplatelet use.      Different anesthesia methods/types have been discussed with the patient, but they are aware that the final plan will be decided by the assigned anesthesia provider on the date of service.        The patient is optimized for their procedure. AVS with information on surgery time/arrival time, meds and NPO status given by nursing staff. No further diagnostic testing indicated.      On the day of service:     Prep time: 12 minutes  Visit time: 10 minutes  Documentation time: 12 minutes  ------------------------------------------  Total time: 34 minutes      Kaylie Gamino PA-C  Preoperative Assessment Center  [facility]  Phone: [phone]  Fax: [phone]  "

## (undated) DEVICE — SU VICRYL 0 UR-6 27" J603H

## (undated) DEVICE — LINEN ORTHO PACK 5446

## (undated) DEVICE — SU VICRYL 2-0 SH 27" UND J417H

## (undated) DEVICE — FCP BIOPSY URETEROSCOPIC PIRAHNA  3FR M0065051600

## (undated) DEVICE — SU VICRYL 3-0 SH 27" UND J416H

## (undated) DEVICE — SU MONOCRYL 4-0 PS-2 27" UND Y426H

## (undated) DEVICE — SPONGE LAP 18X18" X8435

## (undated) DEVICE — SU PDS II 0 CTX 36" Z370T

## (undated) DEVICE — PACK GOWN 3/PK DISP XL SBA32GPFCB

## (undated) DEVICE — PREP CHLORAPREP 26ML TINTED HI-LITE ORANGE 930815

## (undated) DEVICE — GUIDEWIRE SENSOR DUAL FLEX STR 0.035"X150CM M0066703080

## (undated) DEVICE — SURGICEL HEMOSTAT 4X8" 1952

## (undated) DEVICE — DRAPE IOBAN INCISE 23X17" 6650EZ

## (undated) DEVICE — GOWN IMPERVIOUS SPECIALTY XLG/XLONG 32474

## (undated) DEVICE — SYSTEM LAPAROVUE VISIBILITY LAPVUE10

## (undated) DEVICE — LINEN TOWEL PACK X5 5464

## (undated) DEVICE — DAVINCI XI MONOPOLAR SCISSORS HOT SHEARS 8MM 470179

## (undated) DEVICE — DAVINCI S ESU FCP BIPOLAR MARYLAND 420172

## (undated) DEVICE — ENDO TROCAR FIRST ENTRY KII FIOS ADV FIX 12X100MM CFF73

## (undated) DEVICE — SU SILK 0 TIE 6X30" A306H

## (undated) DEVICE — VESSEL LOOPS YELLOW MAXI 31145694

## (undated) DEVICE — SYR 50ML LL W/O NDL 309653

## (undated) DEVICE — POSITIONER ARMBOARD FOAM 1PAIR LF FP-ARMB1

## (undated) DEVICE — CLIP ENDO HEMO-LOC PURPLE LG 544240

## (undated) DEVICE — DECANTER TRANSFER DEVICE 2008S

## (undated) DEVICE — SU VICRYL 0 CT-1 27" UND J260H

## (undated) DEVICE — DAVINCI XI DRAPE ARM 470015

## (undated) DEVICE — CATH URETERAL OPEN END 5FRX70CM M0064002010

## (undated) DEVICE — SOL WATER IRRIG 1000ML BOTTLE 2F7114

## (undated) DEVICE — TUBING IV EXTENSION SET 60" HI FLOW 2N3349

## (undated) DEVICE — SUCTION TIP YANKAUER STR K87

## (undated) DEVICE — DRAPE WARMER 66X44" ORS-300

## (undated) DEVICE — DRAPE U SPLIT 74X120" 29440

## (undated) DEVICE — DAVINCI HOT SHEARS TIP COVER  400180

## (undated) DEVICE — NDL INSUFFLATION 13GA 120MM C2201

## (undated) DEVICE — Device

## (undated) DEVICE — LINEN TOWEL PACK X30 5481

## (undated) DEVICE — ESU GROUND PAD ADULT REM W/15' CORD E7507DB

## (undated) DEVICE — SU SILK 2-0 TIE 12X30" A305H

## (undated) DEVICE — ENDO TROCAR CONMED AIRSEAL BLADELESS 12X120MM IAS12-120LP

## (undated) DEVICE — PREP POVIDONE-IODINE 7.5% SCRUB 4OZ BOTTLE MDS093945

## (undated) DEVICE — STRAP KNEE/BODY 31143004

## (undated) DEVICE — TUBING SMOKE EVAC PNEUVIEW 9660-XE

## (undated) DEVICE — ENDO SEAL BX PORT BPS-A

## (undated) DEVICE — SOL NACL 0.9% IRRIG 3000ML BAG 2B7477

## (undated) DEVICE — ENDO POUCH UNIVERSAL RETRIEVAL SYSTEM INZII 12/15MM CD004

## (undated) DEVICE — ADH SKIN CLOSURE PREMIERPRO EXOFIN 1.0ML 3470

## (undated) DEVICE — SU PDS II 4-0 RB-1 27" Z304H

## (undated) DEVICE — DAVINCI XI SEAL UNIVERSAL 5-8MM 470361

## (undated) DEVICE — SUCTION MANIFOLD NEPTUNE 2 SYS 4 PORT 0702-020-000

## (undated) DEVICE — LINEN GOWN X4 5410

## (undated) DEVICE — PREP POVIDONE-IODINE 10% SOLUTION 4OZ BOTTLE MDS093944

## (undated) DEVICE — CATH TRAY FOLEY SURESTEP 16FR W/TMP PRB STLK LATEX A319416AM

## (undated) DEVICE — WIPES FOLEY CARE SURESTEP PROVON DFC100

## (undated) DEVICE — DRAPE LAP W/ARMBOARD 29410

## (undated) DEVICE — DRAPE SHEET REV FOLD 3/4 9349

## (undated) DEVICE — LINEN TOWEL PACK X6 WHITE 5487

## (undated) DEVICE — PROTECTOR ARM ONE-STEP TRENDELENBURG 40418

## (undated) DEVICE — TUBING FILTER TRI-LUMEN AIRSEAL ASC-EVAC1

## (undated) DEVICE — BLADE SWITCH SCISSORS TIP 5MM 89-5100B

## (undated) DEVICE — DAVINCI XI DRIVER NDL LARGE 8MM EXT 471006

## (undated) DEVICE — SU SILK 3-0 TIE 12X30" A304H

## (undated) DEVICE — GLOVE BIOGEL PI MICRO SZ 8.0 48580

## (undated) DEVICE — DAVINCI XI DRAPE COLUMN 470341

## (undated) DEVICE — TUBING IRRIG TUR Y TYPE 96" LF 6543-01

## (undated) DEVICE — DRAPE C-ARM W/STRAPS 42X72" 07-CA104

## (undated) DEVICE — DRAPE GYN/UROLOGY FLUID POUCH TUR 29455

## (undated) DEVICE — SUCTION IRR STRYKERFLOW II W/TIP 250-070-520

## (undated) DEVICE — PACK DAVINCI UROL

## (undated) DEVICE — ESU CORD BIPOLAR GREEN 10-4000

## (undated) DEVICE — STRAP UNIVERSAL POSITIONING 2-PIECE 4X47X76" 91-287

## (undated) DEVICE — NDL 27GA 1.25" 305136

## (undated) DEVICE — TUBING SUCTION MEDI-VAC SOFT 3/16"X20' N520A

## (undated) DEVICE — GLOVE BIOGEL PI MICRO SZ 8.5 48585

## (undated) DEVICE — DAVINCI SI DRAPE ACCESSORY KIT 3-ARM 420290

## (undated) DEVICE — GLOVE PROTEXIS W/NEU-THERA 8.0  2D73TE80

## (undated) DEVICE — TUBING SUCTION 10'X3/16" N510

## (undated) DEVICE — SU MONOCRYL 4-0 PS-2 18" UND Y496G

## (undated) DEVICE — DAVINCI XI ESU BIPOLAR 3MM ENDOWRIST FENESTRATED EXT 471205

## (undated) DEVICE — DRSG TELFA 3X8" 1238

## (undated) DEVICE — STPL POWERED ECHELON VASC 35MM PVE35A

## (undated) DEVICE — SYR 50ML CATH TIP W/O NDL 309620

## (undated) DEVICE — SU SILK 1 TIE 6X30" A307H

## (undated) DEVICE — SOL NACL 0.9% INJ 1000ML BAG 2B1324X

## (undated) DEVICE — LINEN GOWN XLG 5407

## (undated) DEVICE — SYR 10ML FINGER CONTROL W/O NDL 309695

## (undated) RX ORDER — CEFAZOLIN SODIUM/WATER 3 G/30 ML
SYRINGE (ML) INTRAVENOUS
Status: DISPENSED
Start: 2023-04-24

## (undated) RX ORDER — CEFAZOLIN SODIUM IN 0.9 % NACL 3 G/100 ML
INTRAVENOUS SOLUTION, PIGGYBACK (ML) INTRAVENOUS
Status: DISPENSED
Start: 2022-02-14

## (undated) RX ORDER — HEPARIN SODIUM 5000 [USP'U]/.5ML
INJECTION, SOLUTION INTRAVENOUS; SUBCUTANEOUS
Status: DISPENSED
Start: 2023-04-24

## (undated) RX ORDER — FENTANYL CITRATE 50 UG/ML
INJECTION, SOLUTION INTRAMUSCULAR; INTRAVENOUS
Status: DISPENSED
Start: 2022-02-14

## (undated) RX ORDER — FENTANYL CITRATE 50 UG/ML
INJECTION, SOLUTION INTRAMUSCULAR; INTRAVENOUS
Status: DISPENSED
Start: 2023-04-24

## (undated) RX ORDER — BUPIVACAINE HYDROCHLORIDE 2.5 MG/ML
INJECTION, SOLUTION EPIDURAL; INFILTRATION; INTRACAUDAL
Status: DISPENSED
Start: 2023-04-24

## (undated) RX ORDER — BUPIVACAINE HYDROCHLORIDE 2.5 MG/ML
INJECTION, SOLUTION INFILTRATION; PERINEURAL
Status: DISPENSED
Start: 2022-02-14

## (undated) RX ORDER — HYDROMORPHONE HCL IN WATER/PF 6 MG/30 ML
PATIENT CONTROLLED ANALGESIA SYRINGE INTRAVENOUS
Status: DISPENSED
Start: 2023-04-24

## (undated) RX ORDER — ACETAMINOPHEN 325 MG/1
TABLET ORAL
Status: DISPENSED
Start: 2022-02-14

## (undated) RX ORDER — LIDOCAINE HYDROCHLORIDE 20 MG/ML
INJECTION, SOLUTION EPIDURAL; INFILTRATION; INTRACAUDAL; PERINEURAL
Status: DISPENSED
Start: 2022-02-14

## (undated) RX ORDER — PROPOFOL 10 MG/ML
INJECTION, EMULSION INTRAVENOUS
Status: DISPENSED
Start: 2022-02-14

## (undated) RX ORDER — HYDROMORPHONE HYDROCHLORIDE 1 MG/ML
INJECTION, SOLUTION INTRAMUSCULAR; INTRAVENOUS; SUBCUTANEOUS
Status: DISPENSED
Start: 2023-04-24

## (undated) RX ORDER — ONDANSETRON 2 MG/ML
INJECTION INTRAMUSCULAR; INTRAVENOUS
Status: DISPENSED
Start: 2022-02-14

## (undated) RX ORDER — HYDROMORPHONE HCL IN WATER/PF 6 MG/30 ML
PATIENT CONTROLLED ANALGESIA SYRINGE INTRAVENOUS
Status: DISPENSED
Start: 2022-02-14